# Patient Record
Sex: FEMALE | Race: BLACK OR AFRICAN AMERICAN | HISPANIC OR LATINO | Employment: UNEMPLOYED | URBAN - METROPOLITAN AREA
[De-identification: names, ages, dates, MRNs, and addresses within clinical notes are randomized per-mention and may not be internally consistent; named-entity substitution may affect disease eponyms.]

---
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
## 2019-01-22 ENCOUNTER — HOSPITAL ENCOUNTER (EMERGENCY)
Facility: HOSPITAL | Age: 19
Discharge: HOME | End: 2019-01-22
Attending: EMERGENCY MEDICINE | Admitting: EMERGENCY MEDICINE
Payer: COMMERCIAL

## 2019-01-22 VITALS
TEMPERATURE: 98.4 F | SYSTOLIC BLOOD PRESSURE: 104 MMHG | DIASTOLIC BLOOD PRESSURE: 61 MMHG | RESPIRATION RATE: 18 BRPM | HEART RATE: 70 BPM | WEIGHT: 118.4 LBS | OXYGEN SATURATION: 100 %

## 2019-01-22 DIAGNOSIS — O23.40 UTI IN PREGNANCY: Primary | ICD-10-CM

## 2019-01-22 DIAGNOSIS — O21.9 NAUSEA AND VOMITING IN PREGNANCY: ICD-10-CM

## 2019-01-22 LAB
ANION GAP SERPL CALCULATED.3IONS-SCNC: 12 MMOL/L (ref 4–13)
BACTERIA UR QL AUTO: ABNORMAL /HPF
BASOPHILS # BLD AUTO: 0.04 THOUSANDS/ΜL (ref 0–0.1)
BASOPHILS NFR BLD AUTO: 1 % (ref 0–1)
BILIRUB UR QL STRIP: NEGATIVE
BUN SERPL-MCNC: 6 MG/DL (ref 5–25)
CALCIUM SERPL-MCNC: 9.9 MG/DL (ref 8.3–10.1)
CHLORIDE SERPL-SCNC: 101 MMOL/L (ref 100–108)
CLARITY UR: ABNORMAL
CO2 SERPL-SCNC: 23 MMOL/L (ref 21–32)
COLOR UR: ABNORMAL
CREAT SERPL-MCNC: 0.52 MG/DL (ref 0.6–1.3)
EOSINOPHIL # BLD AUTO: 0.1 THOUSAND/ΜL (ref 0–0.61)
EOSINOPHIL NFR BLD AUTO: 2 % (ref 0–6)
ERYTHROCYTE [DISTWIDTH] IN BLOOD BY AUTOMATED COUNT: 12.7 % (ref 11.6–15.1)
GFR SERPL CREATININE-BSD FRML MDRD: 160 ML/MIN/1.73SQ M
GLUCOSE SERPL-MCNC: 87 MG/DL (ref 65–140)
GLUCOSE UR STRIP-MCNC: NEGATIVE MG/DL
HCT VFR BLD AUTO: 41.6 % (ref 34.8–46.1)
HGB BLD-MCNC: 13.6 G/DL (ref 11.5–15.4)
HGB UR QL STRIP.AUTO: NEGATIVE
IMM GRANULOCYTES # BLD AUTO: 0.02 THOUSAND/UL (ref 0–0.2)
IMM GRANULOCYTES NFR BLD AUTO: 0 % (ref 0–2)
KETONES UR STRIP-MCNC: ABNORMAL MG/DL
LEUKOCYTE ESTERASE UR QL STRIP: ABNORMAL
LYMPHOCYTES # BLD AUTO: 1.51 THOUSANDS/ΜL (ref 0.6–4.47)
LYMPHOCYTES NFR BLD AUTO: 24 % (ref 14–44)
MCH RBC QN AUTO: 30.4 PG (ref 26.8–34.3)
MCHC RBC AUTO-ENTMCNC: 32.7 G/DL (ref 31.4–37.4)
MCV RBC AUTO: 93 FL (ref 82–98)
MONOCYTES # BLD AUTO: 0.87 THOUSAND/ΜL (ref 0.17–1.22)
MONOCYTES NFR BLD AUTO: 14 % (ref 4–12)
NEUTROPHILS # BLD AUTO: 3.74 THOUSANDS/ΜL (ref 1.85–7.62)
NEUTS SEG NFR BLD AUTO: 59 % (ref 43–75)
NITRITE UR QL STRIP: NEGATIVE
NON-SQ EPI CELLS URNS QL MICRO: ABNORMAL /HPF
NRBC BLD AUTO-RTO: 0 /100 WBCS
PH UR STRIP.AUTO: 8.5 [PH] (ref 4.5–8)
PLATELET # BLD AUTO: 248 THOUSANDS/UL (ref 149–390)
PMV BLD AUTO: 10.6 FL (ref 8.9–12.7)
POTASSIUM SERPL-SCNC: 4.8 MMOL/L (ref 3.5–5.3)
PROT UR STRIP-MCNC: ABNORMAL MG/DL
RBC # BLD AUTO: 4.47 MILLION/UL (ref 3.81–5.12)
RBC #/AREA URNS AUTO: ABNORMAL /HPF
SODIUM SERPL-SCNC: 136 MMOL/L (ref 136–145)
SP GR UR STRIP.AUTO: 1.02 (ref 1–1.03)
UROBILINOGEN UR QL STRIP.AUTO: 0.2 E.U./DL
WBC # BLD AUTO: 6.28 THOUSAND/UL (ref 4.31–10.16)
WBC #/AREA URNS AUTO: ABNORMAL /HPF

## 2019-01-22 PROCEDURE — 96365 THER/PROPH/DIAG IV INF INIT: CPT

## 2019-01-22 PROCEDURE — 85025 COMPLETE CBC W/AUTO DIFF WBC: CPT | Performed by: EMERGENCY MEDICINE

## 2019-01-22 PROCEDURE — 96366 THER/PROPH/DIAG IV INF ADDON: CPT

## 2019-01-22 PROCEDURE — 36415 COLL VENOUS BLD VENIPUNCTURE: CPT | Performed by: EMERGENCY MEDICINE

## 2019-01-22 PROCEDURE — 87086 URINE CULTURE/COLONY COUNT: CPT

## 2019-01-22 PROCEDURE — 81001 URINALYSIS AUTO W/SCOPE: CPT

## 2019-01-22 PROCEDURE — 80048 BASIC METABOLIC PNL TOTAL CA: CPT | Performed by: EMERGENCY MEDICINE

## 2019-01-22 PROCEDURE — 96375 TX/PRO/DX INJ NEW DRUG ADDON: CPT

## 2019-01-22 PROCEDURE — 99284 EMERGENCY DEPT VISIT MOD MDM: CPT

## 2019-01-22 RX ORDER — ONDANSETRON 4 MG/1
4 TABLET, ORALLY DISINTEGRATING ORAL EVERY 8 HOURS PRN
Qty: 15 TABLET | Refills: 0 | Status: SHIPPED | OUTPATIENT
Start: 2019-01-22 | End: 2019-05-02 | Stop reason: ALTCHOICE

## 2019-01-22 RX ORDER — CEPHALEXIN 500 MG/1
500 CAPSULE ORAL EVERY 12 HOURS SCHEDULED
Qty: 10 CAPSULE | Refills: 0 | Status: SHIPPED | OUTPATIENT
Start: 2019-01-22 | End: 2019-01-27

## 2019-01-22 RX ORDER — ONDANSETRON 2 MG/ML
4 INJECTION INTRAMUSCULAR; INTRAVENOUS ONCE
Status: COMPLETED | OUTPATIENT
Start: 2019-01-22 | End: 2019-01-22

## 2019-01-22 RX ADMIN — ONDANSETRON 4 MG: 2 INJECTION INTRAMUSCULAR; INTRAVENOUS at 17:46

## 2019-01-22 RX ADMIN — DEXTROSE AND SODIUM CHLORIDE 1000 ML: 5; .9 INJECTION, SOLUTION INTRAVENOUS at 18:03

## 2019-01-22 NOTE — ED PROVIDER NOTES
History  Chief Complaint   Patient presents with    Vomiting During Pregnancy      used - Reports feeling nauseus and having abdominal cramping "all day long" Approximately 9 weeks pregnant  Has not followed up with OB  30-year-old  presents for evaluation of nausea and vomiting with pregnancy  The patient states that her symptoms been ongoing for the past week  They are worse when she attempts to eat  The patient is now having trouble keeping fluids down  She has not had any outpatient prenatal care to this point  She was seen in the emergency department had a pelvic ultrasound demonstrating an IUP  She has been attempting to take prenatal vitamins  None       Past Medical History:   Diagnosis Date    Medical history non-contributory        Past Surgical History:   Procedure Laterality Date    NO PAST SURGERIES         History reviewed  No pertinent family history  I have reviewed and agree with the history as documented  Social History   Substance Use Topics    Smoking status: Never Smoker    Smokeless tobacco: Never Used    Alcohol use No        Review of Systems   Constitutional: Negative for chills and fever  Gastrointestinal: Positive for abdominal pain, nausea and vomiting  Genitourinary: Negative for difficulty urinating, vaginal bleeding and vaginal discharge  All other systems reviewed and are negative  Physical Exam  Physical Exam   Constitutional: She is oriented to person, place, and time  She appears well-developed and well-nourished  No distress  HENT:   Head: Normocephalic and atraumatic  Right Ear: External ear normal    Left Ear: External ear normal    Eyes: Pupils are equal, round, and reactive to light  Conjunctivae and EOM are normal  No scleral icterus  Neck: Normal range of motion  Cardiovascular: Normal rate, regular rhythm and normal heart sounds      Pulmonary/Chest: Effort normal and breath sounds normal  No respiratory distress  Abdominal: Soft  Bowel sounds are normal  There is no tenderness  There is no rebound and no guarding  Musculoskeletal: Normal range of motion  She exhibits no edema  Neurological: She is alert and oriented to person, place, and time  Skin: Skin is warm and dry  No rash noted  Psychiatric: She has a normal mood and affect  Nursing note and vitals reviewed  Vital Signs  ED Triage Vitals   Temperature Pulse Respirations Blood Pressure SpO2   01/22/19 1544 01/22/19 1544 01/22/19 1544 01/22/19 1544 01/22/19 1544   98 4 °F (36 9 °C) 66 20 112/53 98 %      Temp Source Heart Rate Source Patient Position - Orthostatic VS BP Location FiO2 (%)   01/22/19 1544 01/22/19 1544 01/22/19 1544 01/22/19 1544 --   Temporal Monitor Sitting Right arm       Pain Score       01/22/19 1830       No Pain           Vitals:    01/22/19 1544 01/22/19 1830   BP: 112/53 104/61   Pulse: 66 70   Patient Position - Orthostatic VS: Sitting Lying       Visual Acuity      ED Medications  Medications   ondansetron (ZOFRAN) injection 4 mg (4 mg Intravenous Given 1/22/19 1746)   dextrose 5 % and sodium chloride 0 9 % bolus 1,000 mL (0 mL Intravenous Stopped 1/22/19 1943)       Diagnostic Studies  Results Reviewed     Procedure Component Value Units Date/Time    Basic metabolic panel [[id number]]  (Abnormal) Collected:  01/22/19 1747    Lab Status:  Final result Specimen:  Blood from Arm, Right Updated:  01/22/19 1858     Sodium 136 mmol/L      Potassium 4 8 mmol/L      Chloride 101 mmol/L      CO2 23 mmol/L      ANION GAP 12 mmol/L      BUN 6 mg/dL      Creatinine 0 52 (L) mg/dL      Glucose 87 mg/dL      Calcium 9 9 mg/dL      eGFR 160 ml/min/1 73sq m     Narrative:         National Kidney Disease Education Program recommendations are as follows:  GFR calculation is accurate only with a steady state creatinine  Chronic Kidney disease less than 60 ml/min/1 73 sq  meters  Kidney failure less than 15 ml/min/1 73 sq  meters  Urine Microscopic [[id number]]  (Abnormal) Collected:  01/22/19 1751    Lab Status:  Final result Specimen:  Urine from Urine, Clean Catch Updated:  01/22/19 1831     RBC, UA 4-10 (A) /hpf      WBC, UA 10-20 (A) /hpf      Epithelial Cells Occasional /hpf      Bacteria, UA Occasional /hpf     CBC and differential [[id number]]  (Abnormal) Collected:  01/22/19 1747    Lab Status:  Final result Specimen:  Blood from Arm, Right Updated:  01/22/19 1759     WBC 6 28 Thousand/uL      RBC 4 47 Million/uL      Hemoglobin 13 6 g/dL      Hematocrit 41 6 %      MCV 93 fL      MCH 30 4 pg      MCHC 32 7 g/dL      RDW 12 7 %      MPV 10 6 fL      Platelets 806 Thousands/uL      nRBC 0 /100 WBCs      Neutrophils Relative 59 %      Immat GRANS % 0 %      Lymphocytes Relative 24 %      Monocytes Relative 14 (H) %      Eosinophils Relative 2 %      Basophils Relative 1 %      Neutrophils Absolute 3 74 Thousands/µL      Immature Grans Absolute 0 02 Thousand/uL      Lymphocytes Absolute 1 51 Thousands/µL      Monocytes Absolute 0 87 Thousand/µL      Eosinophils Absolute 0 10 Thousand/µL      Basophils Absolute 0 04 Thousands/µL     Urine culture [[id number]] Collected:  01/22/19 1751    Lab Status:   In process Specimen:  Urine from Urine, Clean Catch Updated:  01/22/19 1741    POCT urinalysis dipstick [[id number]]  (Abnormal) Resulted:  01/22/19 1739    Lab Status:  Final result Specimen:  Urine Updated:  01/22/19 1739    ED Urine Macroscopic [[id number]]  (Abnormal) Collected:  01/22/19 1751    Lab Status:  Final result Specimen:  Urine Updated:  01/22/19 1734     Color, UA Celia     Clarity, UA Cloudy     pH, UA 8 5 (H)     Leukocytes, UA Small (A)     Nitrite, UA Negative     Protein, UA Trace (A) mg/dl      Glucose, UA Negative mg/dl      Ketones, UA 40 (2+) (A) mg/dl      Urobilinogen, UA 0 2 E U /dl      Bilirubin, UA Negative     Blood, UA Negative     Specific Gravity, UA 1 020    Narrative:       CLINITEK RESULT No orders to display              Procedures  Procedures       Phone Contacts  ED Phone Contact    ED Course  ED Course as of Jan 23 0018   Tue Jan 22, 2019   1909 Patient improved upon re-evaluation  The plan is to discharge patient with Zofran and antibiotics for urinary tract infection as well as the nausea and vomiting  Outpatient follow-up with OBGYN  The patient (and any family present) verbalized understanding of the discharge instructions and warnings that would necessitate return to the Emergency Department  All questions were answered prior to discharge  MDM  Number of Diagnoses or Management Options  Nausea and vomiting in pregnancy: new and requires workup  UTI in pregnancy: new and requires workup  Diagnosis management comments: Differential diagnosis:  Hyperemesis gravidarum, urinary tract infection, dehydration, other  The plan is to check laboratories and urinalysis  The patient will be given D5 normal saline for rehydration as well as Zofran for the nausea and vomiting  The patient (and any family present) verbalized understanding of the discharge instructions and warnings that would necessitate return to the Emergency Department  All questions were answered prior to discharge         Amount and/or Complexity of Data Reviewed  Clinical lab tests: ordered and reviewed  Review and summarize past medical records: yes      CritCare Time    Disposition  Final diagnoses:   UTI in pregnancy   Nausea and vomiting in pregnancy     Time reflects when diagnosis was documented in both MDM as applicable and the Disposition within this note     Time User Action Codes Description Comment    1/22/2019  7:03 PM Angelica Collins [O23 40] UTI in pregnancy     1/22/2019  7:03 PM Ebenezer Malone [O21 9] Nausea and vomiting in pregnancy       ED Disposition     ED Disposition Condition Comment    Discharge  [facility] discharge to home/self care     Condition at discharge: Good        Follow-up Information     Follow up With Specialties Details Why Contact Info Additional [facility] Schedule an appointment as soon as possible for a visit For further evaluation Radha Costello [phone]  [address], [phone]          Discharge Medication List as of 1/22/2019  7:05 PM      START taking these medications    Details   cephalexin (KEFLEX) 500 mg capsule Take 1 capsule (500 mg total) by mouth every 12 (twelve) hours for 5 days, Starting Tue 1/22/2019, Until Sun 1/27/2019, Print      ondansetron (ZOFRAN-ODT) 4 mg disintegrating tablet Take 1 tablet (4 mg total) by mouth every 8 (eight) hours as needed for nausea or vomiting, Starting Tue 1/22/2019, Print           No discharge procedures on file      ED Provider  Electronically Signed by           Sherita Torres,   01/23/19 6877

## 2019-01-23 LAB — BACTERIA UR CULT: NORMAL

## 2019-01-23 NOTE — ED NOTES
Per Dr Gil Mcclain, patient is ready for discharge, but wants more fluid to run because she kept bending arm  Will reassess       Kieran Jimenez RN  01/22/19 1940

## 2019-01-23 NOTE — DISCHARGE INSTRUCTIONS
Acute Nausea and Vomiting   WHAT YOU NEED TO KNOW:   Acute nausea and vomiting start suddenly, worsen quickly, and last a short time  DISCHARGE INSTRUCTIONS:   Return to the emergency department if:   · You see blood in your vomit or your bowel movements  · You have sudden, severe pain in your chest and upper abdomen after hard vomiting or retching  · You have swelling in your neck and chest      · You are dizzy, cold, and thirsty and your eyes and mouth are dry  · You are urinating very little or not at all  · You have muscle weakness, leg cramps, and trouble breathing  · Your heart is beating much faster than normal      · You continue to vomit for more than 48 hours  Contact your healthcare provider if:   · You have frequent dry heaves (vomiting but nothing comes out)  · Your nausea and vomiting does not get better or go away after you use medicine  · You have questions or concerns about your condition or treatment  Medicines: You may need any of the following:  · Medicines  may be given to calm your stomach and stop your vomiting  You may also need medicines to help you feel more relaxed or to stop nausea and vomiting caused by motion sickness  · Gastrointestinal stimulants  are used to help empty your stomach and bowels  This may help decrease nausea and vomiting  · Take your medicine as directed  Contact your healthcare provider if you think your medicine is not helping or if you have side effects  Tell him or her if you are allergic to any medicine  Keep a list of the medicines, vitamins, and herbs you take  Include the amounts, and when and why you take them  Bring the list or the pill bottles to follow-up visits  Carry your medicine list with you in case of an emergency  Prevent or manage acute nausea and vomiting:   · Do not drink alcohol  Alcohol may upset or irritate your stomach  Too much alcohol can also cause acute nausea and vomiting  · Control stress    Headaches due to stress may cause nausea and vomiting  Find ways to relax and manage your stress  Get more rest and sleep  · Drink more liquids as directed  Vomiting can lead to dehydration  It is important to drink more liquids to help replace lost body fluids  Ask your healthcare provider how much liquid to drink each day and which liquids are best for you  Your provider may recommend that you drink an oral rehydration solution (ORS)  ORS contains water, salts, and sugar that are needed to replace the lost body fluids  Ask what kind of ORS to use, how much to drink, and where to get it  · Eat smaller meals, more often  Eat small amounts of food every 2 to 3 hours, even if you are not hungry  Food in your stomach may decrease your nausea  · Talk to your healthcare provider before you take over-the-counter (OTC) medicines  These medicines can cause serious problems if you use certain other medicines, or you have a medical condition  You may have problems if you use too much or use them for longer than the label says  Follow directions on the label carefully  Follow up with your healthcare provider as directed:  Write down your questions so you remember to ask them during your follow-up visits  © 2017 [address] [employer]  Information is for End User's use only and may not be sold, redistributed or otherwise used for commercial purposes  All illustrations and images included in CareNotes® are the copyrighted property of [employer] , [employer]  or Blair Berman  The above information is an  only  It is not intended as medical advice for individual conditions or treatments  Talk to your doctor, nurse or pharmacist before following any medical regimen to see if it is safe and effective for you  Nausea and Vomiting in Pregnancy   WHAT YOU NEED TO KNOW:   Nausea and vomiting can happen any time of day   These symptoms usually start before the 9th week of pregnancy, and end by the 14th week (second trimester)  Some women can have nausea and vomiting for a longer time  These symptoms can affect some women throughout the entire pregnancy  Nausea and vomiting do not harm your baby  These symptoms can make it hard for you to do your daily activities  DISCHARGE INSTRUCTIONS:   Return to the emergency department if:   · You have signs of dehydration  Examples are dark yellow urine, dry mouth and lips, dry skin, fast heartbeat, and urinating less than usual     · You have severe abdominal pain  · You feel too weak or dizzy to stand up  · You see blood in your vomit or bowel movements  Contact your healthcare provider if:   · You vomit more than 4 times in 1 day  · You have not been able to keep liquids down for more than 1 day  · You lose more than 2 pounds  · You have a fever  · Your nausea and vomiting continue longer than 14 weeks  · You have questions or concerns about your condition or care  Nutrition changes you can make to manage nausea and vomiting:   · Eat small meals throughout the day instead of 3 large meals  You may be more likely to have nausea and vomiting when your stomach is empty  Eat foods that are low in fat and high in protein  Examples are lean meat, beans, turkey, and chicken without the skin  Eat a small snack, such as crackers, dry cereal, or a small sandwich before you go to bed  · Eat some crackers or dry toast before you get out of bed in the morning  Get out of bed slowly  Sudden movements could cause you to get dizzy and nauseated  · Eat bland foods when you feel nauseated  Examples of bland foods include dry toast, dry cereal, plain pasta, white rice, and bread  Other bland foods include saltine crackers, bananas, gelatin, and pretzels  Avoid spicy, greasy, and fried foods  Avoid any other foods that make you feel nauseated  · Drink liquids that contain ginger    Drink ginger ale made with real ginger or ginger tea made with fresh grated shantal  Shantal capsules or shantal candies may also help to decrease nausea and vomiting  · Drink liquids between meals instead of with meals  Wait at least 30 minutes after you eat to drink liquids  Drink small amounts of liquids often throughout the day to prevent dehydration  Ask how much liquid you should drink each day  Other changes you can make to manage nausea and vomiting:   · Avoid smells that bother you  Strong odors may cause nausea and vomiting to start, or make it worse  Take a short walk, turn on a fan, or try to sleep with the window open to get fresh air  When you are cooking, open windows to get rid of smells that may cause nausea  · Do not brush your teeth right after you eat  if it makes you nauseated  · Rest when you need to  Start activity slowly and work up to your usual routine as you start to feel better  · Talk to your healthcare provider about your prenatal vitamins  Prenatal vitamins can cause nausea for some women  Try taking your prenatal vitamin at night or with a snack  If this change does not help, your healthcare provider may recommend a different type of vitamin  · Do not use any medicines, vitamins, or supplements to manage your symptoms without asking your healthcare provider  Many medicines can harm an unborn baby  · Light to moderate exercise  may help to decrease your symptoms  It may also help you to sleep better at night  Ask your healthcare provider about the best exercise plan for you  Follow up with your healthcare provider as directed:  Write down your questions so you remember to ask them during your visits  © 2017 [employer] Information is for End User's use only and may not be sold, redistributed or otherwise used for commercial purposes  All illustrations and images included in CareNotes® are the copyrighted property of A D A M [employer] Inc  or Blair Berman  The above information is an  only   It is not intended as medical advice for individual conditions or treatments  Talk to your doctor, nurse or pharmacist before following any medical regimen to see if it is safe and effective for you  Urinary Tract Infection in Pregnancy   WHAT YOU NEED TO KNOW:   A urinary tract infection (UTI) is caused by bacteria that get inside your urinary tract  The urinary tract includes your kidneys and bladder  UTIs are common in women, especially during pregnancy  This is because of changes in your immune system, hormones, and uterus  As your uterus grows, your bladder may not completely empty  Bacteria can grow in the urine left in your bladder and cause a UTI  UTIs during pregnancy can increase your risk for a kidney infection and  labor  DISCHARGE INSTRUCTIONS:   Return to the emergency department if:   · You are urinating very little or not at all  · You have severe pain  · You have a fever and chills  Contact your healthcare provider if:   · You have pain in the sides of your back  · You do not feel better after 2 days of treatment  · You are vomiting  · You have questions or concerns about your condition or care  Medicines:   · Antibiotics  help fight a bacterial infection  · Medicines  may be given to decrease pain and burning when you urinate  They will also help decrease the feeling that you need to urinate often  These medicines will make your urine orange or red  · Take your medicine as directed  Contact your healthcare provider if you think your medicine is not helping or if you have side effects  Tell him or her if you are allergic to any medicine  Keep a list of the medicines, vitamins, and herbs you take  Include the amounts, and when and why you take them  Bring the list or the pill bottles to follow-up visits  Carry your medicine list with you in case of an emergency  Prevent another UTI:   · Urinate when you feel the urge  Do not hold your urine   Urinate as soon as needed  Always urinate after you have sex  This helps flush out bacteria passed during sex  · Drink liquids as directed  Ask how much liquid to drink each day and which liquids are best for you  You may need to drink more fluids than usual to help flush bacteria out of your urinary tract  Do not drink caffeine or carbonated liquids  These drinks can irritate your bladder  Your healthcare provider may recommend cranberry juice to help prevent a UTI  · Wipe from front to back after you urinate or have a bowel movement  This will help prevent germs from getting into your urinary tract through your urethra  · Do pelvic muscle exercises often  Pelvic muscle exercises may help you start and stop urinating  Strong pelvic muscles may help you empty your bladder easier  Squeeze these muscles tightly for 5 seconds like you are trying to hold back urine  Then relax for 5 seconds  Gradually work up to squeezing for 10 seconds  Do 3 sets of 15 repetitions a day, or as directed  Follow up with your healthcare provider as directed: You may need to return for more urine tests  Write down your questions so you remember to ask them during your visits  © 2017 [address] [employer] Information is for End User's use only and may not be sold, redistributed or otherwise used for commercial purposes  All illustrations and images included in CareNotes® are the copyrighted property of A D A M , Inc  or Blair Berman  The above information is an  only  It is not intended as medical advice for individual conditions or treatments  Talk to your doctor, nurse or pharmacist before following any medical regimen to see if it is safe and effective for you

## 2019-02-14 ENCOUNTER — ULTRASOUND (OUTPATIENT)
Dept: OBGYN CLINIC | Facility: CLINIC | Age: 19
End: 2019-02-14

## 2019-02-14 VITALS
HEART RATE: 100 BPM | HEIGHT: 63 IN | WEIGHT: 117 LBS | BODY MASS INDEX: 20.73 KG/M2 | DIASTOLIC BLOOD PRESSURE: 65 MMHG | SYSTOLIC BLOOD PRESSURE: 98 MMHG

## 2019-02-14 DIAGNOSIS — N91.2 AMENORRHEA: ICD-10-CM

## 2019-02-14 DIAGNOSIS — Z3A.13 13 WEEKS GESTATION OF PREGNANCY: Primary | ICD-10-CM

## 2019-02-14 PROCEDURE — 99213 OFFICE O/P EST LOW 20 MIN: CPT | Performed by: STUDENT IN AN ORGANIZED HEALTH CARE EDUCATION/TRAINING PROGRAM

## 2019-02-14 NOTE — PROGRESS NOTES
Assessment:  22 y o   at approximately 13w3d who presents for a viability scan  Plan:  1  IUP at approximately 13w3d   -ambulatory referral to [facility]   -prenatal panels ordered  2  Will follow up in one week for OB intake      __________________________________________________________________    Subjective   Electa Promise is a 22 y o   who presents for viability scan  Patient denies any contractions, vaginal bleeding, leaking of fluid  This is patient's first pregnancy  This is unplanned but desired pregnancy  Her LMP was mid October, but she does not have regular menses  She has good support at home  She denies any history of STD  She denies a family history of inheritable conditions such as physical or intellectual disabilities, birth defects, blood disorders, heart or neural tube defects  She denies use of nicotine or recreational drug use  The following portions of the patient's history were reviewed and updated as appropriate: allergies, current medications, past family history, past medical history, past social history, past surgical history and problem list     Review of Systems  Pertinent items are noted in HPI           Objective  Vitals:    19 1443   BP: 98/65   Pulse: 100       Physical Exam:  General:   alert and oriented, in no acute distress   Vulva: normal   Vagina: normal mucosa, normal discharge   Cervix: no cervical motion tenderness   Uterus: size consistent with 17 weeks   Adnexa: no mass, fullness, tenderness           Imaging  TAUS:  Single viable IUP at 13w3d - 13w4d  FHT 159bpm  Anterior placenta  No other gross abnormalities

## 2019-02-19 ENCOUNTER — ULTRASOUND (OUTPATIENT)
Dept: PERINATAL CARE | Facility: CLINIC | Age: 19
End: 2019-02-19
Payer: COMMERCIAL

## 2019-02-19 VITALS
HEIGHT: 63 IN | SYSTOLIC BLOOD PRESSURE: 96 MMHG | DIASTOLIC BLOOD PRESSURE: 69 MMHG | HEART RATE: 84 BPM | WEIGHT: 118.6 LBS | BODY MASS INDEX: 21.02 KG/M2

## 2019-02-19 DIAGNOSIS — Z3A.14 14 WEEKS GESTATION OF PREGNANCY: ICD-10-CM

## 2019-02-19 DIAGNOSIS — Z36.82 NUCHAL TRANSLUCENCY OF FETUS ON PRENATAL ULTRASOUND: Primary | ICD-10-CM

## 2019-02-19 PROBLEM — Z34.90 PREGNANCY: Status: ACTIVE | Noted: 2019-02-19

## 2019-02-19 PROCEDURE — 76813 OB US NUCHAL MEAS 1 GEST: CPT | Performed by: OBSTETRICS & GYNECOLOGY

## 2019-02-19 PROCEDURE — 76801 OB US < 14 WKS SINGLE FETUS: CPT | Performed by: OBSTETRICS & GYNECOLOGY

## 2019-02-19 NOTE — PROGRESS NOTES
Used language line [id number]  GILL consistent with 13 week US  No insurance yet  Recommend offering the quad screen when insurance coverage in place  Scheduled 20 week US Jairo Lebron MD

## 2019-02-20 PROBLEM — Z36.9 ENCOUNTER FOR FETAL ULTRASOUND: Status: ACTIVE | Noted: 2019-02-20

## 2019-02-26 ENCOUNTER — INITIAL PRENATAL (OUTPATIENT)
Dept: OBGYN CLINIC | Facility: CLINIC | Age: 19
End: 2019-02-26

## 2019-02-26 VITALS
HEIGHT: 63 IN | WEIGHT: 117 LBS | HEART RATE: 80 BPM | DIASTOLIC BLOOD PRESSURE: 64 MMHG | BODY MASS INDEX: 20.73 KG/M2 | SYSTOLIC BLOOD PRESSURE: 98 MMHG

## 2019-02-26 DIAGNOSIS — Z3A.14 14 WEEKS GESTATION OF PREGNANCY: ICD-10-CM

## 2019-02-26 DIAGNOSIS — Z34.92 PRENATAL CARE IN SECOND TRIMESTER: Primary | ICD-10-CM

## 2019-02-26 DIAGNOSIS — Z36.9 ENCOUNTER FOR FETAL ULTRASOUND: ICD-10-CM

## 2019-02-26 PROCEDURE — 99211 OFF/OP EST MAY X REQ PHY/QHP: CPT

## 2019-02-26 NOTE — PATIENT INSTRUCTIONS
Señales remigio el embarazo: Cuando llamar    1  sangrado vaginal  2  Dolor abdominal jeny que no desaparece  3  Fiebre (más de 100 4 y no se hannah con Tylenol)  4  Vómitos persistentes que freitas más de 24 horas  5  dolor de pecho  6  Dolor o ardor al orinar  7  Dolor de herman severo que no se resuelve con Tylenol  8  Visión borrosa o janis puntos en pineda visión  9  Hinchazón repentina de pineda briseida o brad  10  Enrojecimiento, hinchazón o dolor en trinh pierna  11  Un aumento de peso repentino en pocos días  12  Contar los movimientos fetales del bebé  (después de 28 semanas o el sexto mes de embarazo)  15  Trinh pérdida de líquido acuoso de la vagina: puede ser un chorro, un goteo o trinh humedad continua  14  Después de 20 semanas de embarazo, calambres rítmicos (más de 4 por hora) o menstruales bari dolor Clevester Devorah / Doron Perks y Embarazo:  La siguiente lista de medicamentos de Hebert Landry generalmente se considera ocampo de ryan remigio el [facility]  Tenga cuidado de no duplicar los productos que contienen acetaminofén (Tylenol)  Resfriados / dolor de garganta   Robitussin DM - Simple (guaifenesina)   Aerosol nasal salino   Gárgaras de agua salada caliente   Cepacol pastillas para la garganta o enjuague bucal (cetilpiridinio)   Sucrets (hexylresoricinol)    Harolyn Ophir LA D - o DESCONGESANTE   Claritin (loratadine)   Zrytec (cetirizina)   Allerga (fexofenadina)      Zayda de Zoe Salomón / [place] y molestias:   Tylenol (paracetamol) (acetaminophen)  NO exceda más de 3000 mg de Tylenol en un período de 24 horas        Acidez   Mylanta (hidróxido de aluminio / simeticona, hidróxido de magnesio)   Maalaox (hidróxido de aluminio y [place], hidróxido de magnesio)   Tums (carbonato de calcio)   Riopan (magaldrate)      Estreñimiento   Colace (docusato de sodio)   Surfak (docusato de sodio)   MiraLAX   Supositorios de glicerina   Flota enema (fosfato de sodio y bifosfato de sodio)  Náuseas vómitos   Vitamina B6 (piridoxina): puede ryan 50 mg a la hora de acostarse, 25 mg por la mañana, 25 mg por la tarde   Unisom (doxilamina): se puede usar para las náuseas / vómitos (tk trinh tableta de 25 mg por la mitad)  Puede causar somnolencia  Dormir   Benadryl (difenhidramina): tome 1-2 tabletas según sea necesario antes de acostarse   Tableta Unisom (doxilamina) de 25 mg    Tableta de melatonina de 5 mg: según sea necesario antes de acostarse      En general, la forma genérica de la medicina suele ser más económica que la forma de yamini del [place]  Leeroy Goody Un embarazo,   CUIDADO AMBULATORIO:   Lo qué necesita saber sobre el Magen : Un embarazo normal dura alrededor de 40 semanas  El primer trimestre dura desde pineda último periodo hasta la semana 12 de Magen   El karolina trimestre se extiende desde la semana 13 de pineda embarazo hasta la semana 23  El tercer trimestre se extiende desde la semana 24 de embarazo hasta que nazca pineda bebé  Si usted conoce la fecha de pineda último periodo, pineda médico puede calcular la fecha de nacimiento de pineda bebé  Es posible que usted de a mahsa a pineda bebé en cualquier momento desde la semana 37 hasta 2 semanas después de la fecha calculada de Artemio  Busque atención médica de inmediato si:   · Usted presenta un tony dolor de herman que no desaparece  · Usted tiene cambios en la visión nuevos o en aumento, bari visión borrosa o con manchas  · Usted tiene inflamación nueva o creciente en pineda briseida o brad  · Usted tiene dolor o cólicos en el abdomen o la parte baja de la espalda  · Usted tiene sangrado vaginal   Comuníquese con pineda médico o obstreta si:   · Usted tiene calambres, presión o tensión abdominal     · Usted tiene un cambio en la secreción vaginal     · Usted no puede retener alimentos ni líquidos y está perdiendo [place]  · Usted tiene escalofríos o fiebre      · Usted tiene comezón, ardor o dolor vaginal      · Usted tiene [place] secreción vaginal amarillenta, verdosa, edison o de [employer]  · Usted tiene dolor o ardor al [facility], orina menos de lo habitual o tiene [place] rosada o sanguinolenta  · Usted tiene preguntas o inquietudes acerca de pineda condición o cuidado  Cambios corporales que pueden ocurrir remigio pineda embarazo:   · Los cambios en los senos  que usted Kiesha Robel sensibilidad y cosquilleo remigio la primera parte de pineda [facility]  Los senos se volverán más grandes  Es posible que necesite un sostén con soporte  Es posible que usted cameron [facility] secreción delgada y BILLY, conocida bari calostro, que sale de rolando pezones remigio el karolina trimestre  El calostro es un líquido que se convertirá en [place] alrededor de 3 días después de usted twyla dado a mahsa  · Cambios en la piel y estrías  podrían ocurrir remigio pineda embarazo  Es posible que usted tenga marcas araujo, conocidas bari estrías, en pineda piel  Las [employer] se desvanecen después del [facility]  Utilice crema si pineda piel está seca y con comezón  La piel de pineda briseida, alrededor de los pezones y debajo de pineda ombligo podría oscurecerse  La mayoría del [place], pineda piel Gearld Hitch a pineda color normal después del nacimiento de pineda bebé  · El malestar matutino  consiste en náuseas y vómitos que pueden ocurrir en cualquier momento del día  Evite los alimentos grasosos y picantes  Coma comidas pequeñas remigio el día en vez de porciones grandes  El jengibre puede ayudar a [employer]  Consulte con pineda médico acerca de otras formas para disminuir las náuseas y el vómito  · Acidez estomacal  puede ser causada por los cambios hormonales remigio pineda [place]  El [employer] en crecimiento puede empujar pineda estómago hacia arriba y forzar ácido estomacal a acumularse dentro de pineda esófago  [place] 4 o 5 comidas pequeñas cada día en vez de comidas grandes  Evite los alimentos picantes  Evite comer dionisio antes de irse a la cama      · Estreñimiento  puede desarrollarse remigio pineda embarazo  Para tratar el estreñimiento, coma alimentos altos en fibra bari cereales con fibra, frijoles, frutas, verduras, panes integrales y [place]  Dillon Esequiel de Jacklyn regular y tome suficiente agua  Es posible que pineda médico sugiera un suplemento con fibra para ablandar rolando evacuaciones intestinales  Consulte con pineda médico antes de usar cualquier medicamento para disminuir el estreñimiento  · Las hemorroides  son Radha Mullet grandes en el área rectal  Pueden causar dolor, comezón y sangrado de color blackmon vivo en pineda recto  Para disminuir el riesgo de hemorroides, prevenga el estreñimiento y no se esfuerce cuando tenga trinh evacuación intestinal  Si usted tiene hemorroides, sumérjase en trinh bañera con agua tibia para aliviar la incomodidad  Consulte con pineda médico cómo puede tratar las hemorroides  · Los calambres y la hinchazón en las piernas  pueden ser causados por niveles bajos de calcio o por el peso adicional del [place]  Eleve rolando piernas por encima del nivel de pineda corazón para disminuir la hinchazón  Remigio un calambre en la pierna, estire o de un masaje al [employer] que tiene el calambre  El calor puede ayudar a disminuir el dolor y los espasmos musculares  Aplique calor sobre el músculo por 20 a 30 minutos cada 2 horas por la cantidad de días que se le indique  · Dolor en la espalda  puede ocurrir a medida que pineda bebé crece  No esté de pie por largos periodos de tiempo ni levante objetos pesados  Use trinh buena postura mientras esté de pie, se agache o se doble  Use zapatos de tacón bajo con un buen soporte  Descansar puede también ayudarla a aliviar el dolor de espalda  Pregunte a pineda médico acerca de ejercicios que usted pueda hacer para fortalecer los músculos de pineda espalda  Manténgase saludable remigio pineda embarazo:   · Consuma alimentos saludables y variados    Alimentos saludables incluyen frutas, verduras, panes de carolyn integral, alimentos lácteos bajos en grasa, frijoles, William Lady y pescado  Hodgen líquidos bari se le haya indicado  Pregunte cuánto líquido debe ryan cada día y cuáles líquidos son los más adecuados para usted  Limite el consumo de cafeína a menos de [address]  Limite el consumo de pescado a 2 porciones cada semana  Escoja pescado con concentraciones bajas de dipesh bari atún ligero enlatado, camarón, cangrejo, salmón, bacalao o tilapia  No  coma pescado con concentraciones altas de dipesh bari pez bernice, caballa gigante, pargo rayado y tiburón  · [address] [place] [place]  Pineda necesidad de ciertas vitaminas y [address], bari el ácido fólico, aumenta remigio el [facility]  Las vitaminas prenatales proporcionan algunas de las vitaminas y minerales adicionales que usted necesita  Las vitaminas prenatales también podrían ayudar a disminuir el riesgo de ciertos defectos de nacimiento  · Pregunte cuánto peso usted debe aumentar remigio pineda embarazo  Demasiado aumento de peso o muy poco puede ser poco saludable para usted y pineda bebé  · Consulte con pineda médico acerca de hacer ejercicio  El ejercicio moderado puede ayudarla a mantenerse en forma  Pineda médico la ayudará a planear un programa de ejercicios que sea seguro para usted remigio pineda [facility]  · No fume  Si usted fuma, nunca es demasiado tarde para dejar de hacerlo  Fumar aumenta el riesgo de aborto espontáneo y otros problemas de fernando remigio pineda [place]  Fumar puede causar que pineda bebé nazca antes de tiempo o que pese menos al nacer  Solicite información a pineda médico si usted necesita ayuda para dejar de fumar  · No consuma alcohol  El alcohol pasa de pineda cuerpo al bebé a través de la placenta  Puede afectar el desarrollo del cerebro de pineda bebé y provocar el síndrome de alcoholismo fetal (SAF)  FAS es un refugio de condiciones que causan [address], de comportamiento y de crecimiento  · Consulte con pineda médico antes de ryan cualquier medicamento  Muchos medicamentos pueden perjudicar a tong bebé si usted los fatmata [address]  No tome ningún medicamento, vitaminas, hierbas o suplementos sin matheus consultar con tong Robbin Diesel  use drogas ilegales o de la marcum (bari marihuana o cocaína) mientras está embarazada  Consejos de seguridad:   · Evite jacuzzis y saunas  No use un jacuzzi o un sauna mientras usted está embarazada, especialmente remigio el primer trimestre  Los [facility] y los saunas aumentan la temperatura de tong bebé y el riesgo de defectos de nacimiento  · Evite la toxoplasmosis  [place] es trinh infección causada por comer carne cruda o estar cerca del excremento de un pro infectado  [place] puede causar malformaciones congénitas, aborto espontáneo y Vern Schein  Lávese las brad después de tocar carne cruda  Asegúrese de que la carne esté desiree cocida antes de comerla  Evite los huevos crudos y la Delayne Pupa  Use guantes o pida que alguien la ayude a limpiar la caja de arena del pro mientras usted Ammon Milena  · Consulte con tong médico acerca de viajar  El [address] cómodo para viajar es remigio el karolina trimestre  Pregunte a tong médico si usted puede viajar después de las 36 semanas  Es posible que no pueda viajar en avión después de las 36 [address]  También le puede recomendar que evite largos viajes por carretera  Programe un control con tong médico u obstetra según lo indicado:  Vaya a todas rolando citas prenatales remigio tong embarazo  Anote rolando preguntas para que se acuerde de hacerlas remigio rolando visitas  © 2017 [address] Information is for End User's use only and may not be sold, redistributed or otherwise used for commercial purposes  All illustrations and images included in CareNotes® are the copyrighted property of A D A M , Inc  or Blair Berman  Esta información es sólo para uso en educación  Tong intención no es darle un consejo médico sobre enfermedades o tratamientos  Colsulte con pineda Sylvia Emerson farmacéutico antes de seguir cualquier régimen médico para saber si es seguro y efectivo para usted  [place] y vómito en el embarazo   CUIDADO AMBULATORIO:   La náusea y el vómito en el embarazo  pueden suceder a cualquier hora del día  Estos síntomas usualmente comienzan antes de la semana 9 del embarazo y terminan para la semana 14 (karolina trimestre)  Algunas mujeres pueden tener náusea o vómito por un tiempo prolongado  Estos síntomas pueden afectar a algunas mujeres remigio el embarazo  La náusea y el vómito no dañan a pineda bebé  Estos síntomas pueden dificultarle shona actividades diarias  Busque atención médica de inmediato si:   · Usted presenta signos de deshidratación  Por ejemplo, orina de color amarillo oscuro, boca y labios resecos, piel reseca, latido cardíaco acelerado y orinar menos de lo normal     · Usted tiene dolor abdominal intenso  · Usted se siente demasiado débil o mareado bari para ponerse de pie  · Usted nota selena en pineda vómito o en shona deposiciones  Pregúntele a pineda [place] vitaminas y minerales son adecuados para usted  · Usted vomita más de 4 veces en 1 día  · Usted no ha podido retener líquidos en el estómago por más de 1 día  · Usted pierde más de 2 libras  · Usted tiene fiebre  · Shona náuseas y vómito continúan por más de 14 semanas  · Usted tiene preguntas o inquietudes acerca de pineda condición o cuidado  El tratamiento  para la náusea y el vómito en el embarazo generalmente no es necesario  Usted puede [employer] alimentos que come y en shona actividades para ayudar a controlar shona síntomas  Es posible que usted necesite probar varias cosas para determinar qué funciona mejor para usted  Hable con pineda médico si shona síntomas no mejoran con los cambios que se recomiendan a continuación  Es posible que usted necesite vitamina B6 y medicamento si estos cambios no ayudan o si shona síntomas se vuelven graves     Cambios de nutrición que usted puede realizar para controlar la náusea y el vómito:   · Coma porciones pequeñas remigio el día en vez de 3 comidas con porciones grandes  Es más probable que usted tenga náusea y vómito cuando pineda estómago está vacío  Consuma alimentos bajos en grasa y ricos en proteínas  Ejemplos son Fertile Rang, frijoles, pavo y brittaney sin jorge Lopez, bari galletas saladas, cereal seco o un sandwich chico antes de [place]  · Coma galletas saladas o pan julia antes de levantarse de pineda cama por la mañana  Levántese de la cama lentamente  Los movimientos repentinos podrían provocarle mareos y [place]  · Consuma alimentos blandos cuando se sienta con náuseas  Ejemplos de alimentos blandos son el pan julia, cereal seco, pasta sin Alyssa Budds y grossman  Otros alimentos blandos incluyen a las [employer], plátanos, gelatina y pretzels  Evite los alimentos condimentados, grasosos y fritos  Evite otros alimentos que le provoquen náuseas  · Rotonda líquidos que contengan gengibre  Rotonda refresco de gengibre hecho con gengibre real o té de gengibre hecho con gengibre fresco rallado  Las [employer] o dulces de gengibre también podrían ayudar a aliviar la náusea y el vómito  · Rotonda líquidos entre alimentos en vez de tomarlos con los alimentos  Espere al menos 30 minutos después de comer para ryan líquidos  Rotonda cantidades pequeñas de líquidos con frecuencia remigio el día para evitar la deshidratación  Consulte cuál es la cantidad de líquido que usted debería consumir al día  Otros cambios que usted puede realizar para controlar la náusea y el vómito:   · Evite los olores que la [place]  Los olores hoang podrían provocar que [employer] náuseas y el vómito, o podrían empeorarlo   Camine un poco, prenda un ventilador o trate de dormir con la ventana abierta para respirar aire fresco  Cuando esté cocinando, raul las ventanas para eliminar el olor que podría provocarle náuseas  · No se cepille rolando dientes inmediatamente después de comer  si eso le provoca náuseas  · Descanse cuando lo necesite  Comience trinh actividad lentamente y vuelva a tong rutina normal conforme se empiece a sentir mejor  · Hable con tong médico acerca de las vitaminas prenatales  Las vitaminas prenatales pueden provocar náuseas a algunas mujeres  Trate de tomárselas por la noche o con un bocadillo  Si valencia cambio no le [facility], tong médico podría recomendarle un tipo de vitamina diferente  · No use ningún medicamento, vitamina o suplemento para controlar rolando síntomas sin antes consultarlo con tong médico   Varios medicamentos pueden dañar a tong bebé que no ha nacido  · El ejercicio de ligero a moderado  podría ayudar a aliviar rolando síntomas  También podría ayudarla a dormir mejor por la noche  Pregunte a tong médico acerca del mejor plan de ejercicio para usted  Acuda a rolando consultas de control con tong médico según le indicaron  Anote rolando preguntas para que se acuerde de hacerlas remigio rolando visitas  © 2017 [address] [facility] Information is for End User's use only and may not be sold, redistributed or otherwise used for commercial purposes  All illustrations and images included in CareNotes® are the copyrighted property of A D A M , Inc  or Blair Berman  Esta información es sólo para uso en educación  Tong intención no es darle un consejo médico sobre enfermedades o tratamientos  Colsulte con tong Freeda Kraus farmacéutico antes de seguir cualquier régimen médico para saber si es seguro y efectivo para usted  Virus del Zika: Información para mujeres embarazadas   CUIDADO AMBULATORIO:   El virus del [address] [facility] mosquitos son los portadores del virus del anda  El virus es transmitido a un humano a través de la picadura de un mosquito infectado   El virus también se podría transmitir de [facility] persona a otra por medio de la relación sexual  El virus del [place] se podría transmitir de trinh madre al feto  Stover podría provocar defectos de nacimiento, bari trinh falta de desarrollo cerebral  También puede causar la pérdida del embarazo  En la actualidad no hay trinh vacuna para evitar el virus del [facility]  Los signos y síntomas más comunes incluyen los siguientes:  Es posible que no presente signos ni síntomas del virus del [facility]  Si usted desarrolla signos o síntomas, podrían presentarse de repente y durar por 2 a 7 monika  Puede presentar cualquiera de los siguientes signos o síntomas:  · Fiebre o escalofríos    · Urticaria    · Dolor de herman    · Dolor muscular o articular    · Enrojecimiento o comezón en los ojos  Pregúntele a pineda Lesle Fetch vitaminas y minerales son adecuados para usted  · Usted piensa que ha estado expuesto al virus del [facility]  · Tiene síntomas del virus del [facility]  · Usted tiene preguntas o inquietudes acerca de pineda condición o cuidado  Evite las picaduras de mosquitos:  No debe viajar a un sitio donde le virus del Zika es común  Pregunte a pineda médico qué lugar es seguro para viajar  Evite las picaduras de mosquito para disminuir pineda riesgo de contraer la infección por el virus del Zika:  · Aplique repelente de insectos  Pregunte a pineda médico cuál repelente de insectos es adecuado para usted  La mayoría de los repelentes de insectos se pueden usar sin riesgos remigio el embarazo  Siga las indicaciones en el empaque del repelente de insectos  La siguiente es trinh lista de consejos para usar el repelente de insectos:     ¨ No aplique repelente de insectos en la piel debajo de la ropa  ¨ Aplique protector solar antes de aplicar repelente de insectos  ¨ Use un repelente de insectos cada vez que tenga planeado estar al [place] [employer]  Use un repelente todo el tiempo si usted viaja o vive en lugares de alto riesgo  Vuelva a aplicarse el repelente según las indicaciones       ¨ Aplíquese repelente de insectos todos los días por 3 semanas después de viajar a áreas de [address] riesgo  · Use trinh camisa de manga larga y pantalones  [place] le protegerá la piel de las picaduras de mosquito  · Use mallas o mosquiteros  Use un mosquitero alrededor de pineda cama  Cuando viaje, escoja un lugar que tenga mosquiteros en todas las ventanas y ted  [address] [facility] y ted en pineda casa  Repare las redes o mosquiteros que tengan agujeros, o compre mosquiteros o redes nuevos  · [address] ted y ventanas cerradas  En lo posible, use el aire acondicionado para mantener fresca la casa  · Aplique un repelente de insectos en la ropa y el equipo  [place] incluye las botas, pantalones, medias y carpas de campaña  Ted esto cuando acampe, escale o trabaje en el exterior  Usted también puede comprar prendas de vestir e implementos que ya vienen con el repelente de insectos  · Limpie y vacíe todos los recipientes con agua trinh vez a la semana  [address] recipientes para los [address], los baldes de agua, los tinsley de desagüe de la casa, los floreros y la lilly para pájaros  Los mosquitos ponen los huevos cerca del agua  Es mejor que vacíe y restriegue estos recipientes con agua y [place]  Mantenga los recipientes de agua cubiertos o desiree cerrados en cuanto sea posible  · Use un repelente de insectos en el interior y exterior de pineda casa  Use un atomizador repelente de insectos que es seguro para usar en el interior de pineda casa  Coloque un aparato atomizador contra los mosquitos en el exterior de pineda casa  Instale el aparato en un lugar oscuro y fresco  Consulte a pineda médico dónde puede conseguir estos artículos  Siga las instrucciones que vienen con estos productos  Practique sexo seguro remigio el embarazo:  Lo siguiente disminuirá el riesgo de contraer el virus del [place]  También disminuirá el riesgo de que le pase el virus del Zika a pineda bebé    · No tenga contacto sexual con un hombre o trinh mandy con infección por el virus del [address] embarazada  No tenga contacto sexual con un hombre o trinh mandy que azul estado expuestos al virus del Zika mientras usted Yolanda Shine  Tong ana puede estar en riesgo por exposición si ha viajado a trinh shanell donde existe infección de [facility]  Por sexo nos referimos al sexo oral, vaginal y anal      · Si usted decide tener relaciones sexuales remigio el [facility], utilice un condón o un método de bhatt de látex cada vez que tenga relaciones sexuales  Use protección para todo tipo de contacto sexual con un hombre o trinh mandy  [place] incluye el sexo oral, vaginal y anal  Use un condón nuevo o trinh bhatt de látex cada vez que tenga relaciones sexuales  Asegúrese que el condón se ajuste y esté desiree colocado  Si usted es alérgico al látex, use un producto sin base de látex bari poliuretano  Para obtener la información más actualizada sobre el virus del Zika:  Lo que se conoce del virus del Zika está cambiando rápidamente  Obtenga la información más actualizada en:  · Centers for Disease Control and Prevention Information on [address]  [address] [facility]  Phone: [phone]  Web Address: [facility]  Acuda a rolando consultas de control con tong médico según le indicaron  Anote rolando preguntas para que se acuerde de hacerlas remigio rolando visitas  © 2017 [address]is[address] Information is for End User's use only and may not be sold, redistributed or otherwise used for commercial purposes  All illustrations and images included in CareNotes® are the copyrighted property of A D A M , Inc  or Blair Berman  Esta información es sólo para uso en educación  Tong intención no es darle un consejo médico sobre enfermedades o tratamientos  Colsulte con tong Kathy Wendover farmacéutico antes de seguir cualquier régimen médico para saber si es seguro y efectivo para usted  Dieta para el embarazo   LO QUE NECESITA SABER:   ¿Qué es trinh dieta saludable remigio el [facility]? Nancy Lanes saludable remigio el embarazo es un plan alimenticio que proporciona la cantidad de calorías y nutrientes que usted necesita remigio el embarazo  El cuerpo necesita calorías y nutrientes adicionales para apoyar el desarrollo del bebé  Usted necesita aumentar la cantidad correcta de peso para tener un bebé y un embarazo saludables  Los bebés que nacen con un peso saludable tienen un riesgo abraham de ciertos problemas cardíacos al nacer y remigio pineda leigh  Nancy Lanes saludable podría ayudarlo a evitar que aumente mucho de [place]  El aumento excesivo de peso le podría provocar problemas remigio el Gillespie Faraz y Marialuisa  ¿Qué debería evitar remigio el Zeynep Faraz? · Alcohol:  No darrion alcohol remigio el embarazo  El alcohol puede aumentar el riesgo de sufrir un aborto espontáneo (perder el bebé)  El bebé también podría nacer muy pequeño y [place] Gustavo otros problemas de [place], bari problemas de aprendizaje remigio pineda leigh  · Cafeína:  No se conoce cuales son Eder Shima  Limite el consumo de cafeína para evitar posibles problemas de fernando  La cafeína puede encontrase en el café, té, gaseosas, bebidas deportivas y chocolate  · Alimentos que contienen dipesh:  El dipesh se encuentra de [place] natural en jamshid todos los tipos de pescados y mariscos  Algunos tipos de pescados absorben niveles más altos de dipesh que pueden ser nocivos para un bebé antes de nacer  Consuma solamente pescados y mariscos bajos en dipesh  Coma un silverio de 12 onzas a la semana de pescado o [employer] tengan un nivel bajo de dipesh  Entre éstos se [facility], el ún [place] agua, el abran, el caitlyn y Taz Rios In Pedriolo  Coma sólo 6 onzas de atún walter por semana  El atún albacora tiene más dipesh que el atún [place]  No  coma tiburón, pez bernice, caballa ni blanquillo       · Alimentos crudos o poco cocidos:  Usted no debería comer cynthia, luan, husandhyaos, pescado o mariscos (camarón, chance cifuentes) crudos o poco cocidos  Cocine los alimentos sobrantes y listos para comer bari los hot dogs hasta que estén desiree calientes  · Alimentos sin pasteurizar:  Los alimentos sin pasteurizar son aquéllos que no pasaron por el proceso de calentamiento (pasteurización) que destruye la bacteria  Usted no debe beber [place], Delia 88 que no haya sido pasteurizado  Estos [place] [employer], feta, Camembert, blue y [place]  ¿Qué alimentos se pueden comer remigio el embarazo? Consuma trinh variedad de alimentos de cada laci de los grupos que se enumeran abajo  El [place]'s dirá cuántas porciones de cada refugio usted debería comer diariamente para obtener las calorías suficientes  La cantidad de calorías que usted necesita depende de la actividad diaria, el peso antes del [facility] y [place] actual  Los médicos dividen el embarazo en 3 periodos de tiempo que se conocen bari trimestres  En el primer trimestre, usualmente usted no necesita calorías adicionales  En el karolina y tercer trimestre, la mayoría de las mujeres deberían consumir alrededor de 300 calorías adicionales cada día  · Frutas y verduras:  La mitad del plato debería contener frutas y verduras  ¨ Frutas:  Seleccione frutas frescas, enlatadas o secas tan seguido bari sea posible  ¨ 1 taza de [place], picada, cocida o enlatada (enlatada en almíbar o 100% de [facility])    ¨ Un durazno, naranja o plátano francisca    ¨ ½ taza de fruta seca    ¨ 1 taza de jugo de fruta    ¨ Verduras:  Consuma mas verduras de color claudia oscuro, blackmon o anaranjado  Los vegetales claudia oscuro incluyen la brócoli, [facility] y repollo claudia  Ejemplos de verduras de color anaranjado y blackmon son las zanahorias, el camote, la calabaza de invierno, naranjas y chile blackmon      ¨ 1 taza de vegetales cocidos o crudos    ¨ 1 taza de jugo de verduras    ¨ 2 tazas de hojas verdes crudas    · Granos:  La mitad de los granos que consume cada día deberían ser granos enteros  ¨ Granos integrales      ¨ ½ taza de arroz integral o sourav cocidos    ¨ 1 taza (1 onza) de cereal seco de grano entero    ¨ 1 rebanada de pan de 100% de grano entero o de cordero    ¨ 3 tazas de palomitas de maíz    ¨ Otros granos:      ¨ ½ taza de arroz walter o pasta cocidos    ¨ ½ de un pan inglés    ¨ 1 tortilla pequeña de harina o de maíz    ¨ 1 rosquilla pequeña    · Productos lácteos:  Elija productos lácteos sin grasa o bajos en grasa:    ¨ 1½ onzas de queso firme (dada Vance, brian)     ¨ 1 taza (8 onzas) de leche o yogurt sin o bajo en grasa    ¨ 1 taza de yogurt o pudín congelado bajo en grasa    · Carne y otras garcia de proteínas:  Elija vald magras y de ave  Hornee, ase y cocine la carne a la eliana en vez de freírla  Incluya trinh variedad de mariscos en lugar de algunas vlad y aves cada semana  Consuma trinh variedad de alimentos con proteínas:    ¨ ½ onza de wilfredo secos (12 almendras, 24 pistachos, 7 mitades de nueces) o 1 cucharada de crema de cacahuate (1 onza)    ¨ ¼ de taza de soya tofu o tempeh (1 onza)    ¨ 1 huevo    ¨ ¼ de taza de frijoles, chícharos o lentejas cocidos (1 onza)    ¨ 1 pechuga de brittaney pequeña o 1 trucha pequeña (alrededor de 3 onzas)    ¨ 1 trozo de salmón (4 a 6 onzas)    ¨ 1 hamburguesa pequeña de carne magra (2 a 3 onzas)    · Grasas:  Limite las grasas saturadas, trans y el colesterol  Estas grasas no son saludables y se encuentran en la [facility], la [place], la margarina de last y en la grasa animal  Elija grasas saludables bari la poliinsaturada y la monoinsaturada:     ¨ 1 cucharada de aceite de [place], [place], [place], [place] o de soya    ¨ 1 cucharada de margarina carmelo    ¨ 1 cucharadita de [facility]    ¨ 2 cucharadas de aderezo para ensaladas    ¨ ½ de un aguacate  ¿Qué suplementos vitamínicos y minerales podría necesitar? El médico le indicará si usted necesita un suplemento y qué tipo debería ryan   Hable con pineda médico antes de ryan cualquier otra clase de suplemento, incluyendo los herbales (naturales)  · Vitaminas prenatales:  Consuma trinh variedad de alimentos saludables, aun si usted está tomando vitaminas prenatales  Si olvida ryan la vitamina, no se tome el doble al siguiente día  · Ácido fólico:  Usted necesita al menos 572 mcg de ácido fólico por día antes de embarazarse  El ácido fólico ayuda a formar el cerebro y la médula dumont del bebé en el comienzo del [facility]  Remigio el [facility], tong necesidad diaria de ácido fólico aumenta a alrededor de 600 mcg  Obtenga diariamente ácido fólico, consumiendo frutas y jugos cítricos, verduras de hojas verdes, hígado o frijoles secos  El ácido fólico también se agrega a ciertos [employer] cereales para el desayuno, los productos de grossman, las harinas y las pastas  · Ledy:  El ledy es un mineral que el cuerpo necesita para producir hemoglobina, que es trinh parte de los glóbulos rojos  La hemoglobina ayuda a que la selena transporte oxígeno de los pulmones al ana del cuerpo  Los alimentos que son Lelia Musty buena lilly de ledy son la carne, la carne de ave, pescado, frijoles, espinaca y cereales y panes fortificados  Tong cuerpo absorberá el ledy de garcia de carne, si tiene trinh lilly de vitamina C al [employer]  [place] té y café lejos de alimentos fortificados con ledy y suplementos de ledy  Usted necesita alrededor de 30 miligramos de ledy cada día remigio el embarazo  · Calcio y vitamina D:  Las mujeres que no consumen productos lácteos pueden necesitar un suplemento de calcio y vitamina D  Hable con tong médico sobre suplementos de calcio si no come regularmente buenas garcia de calcio  La cantidad de calcio que usted necesita es de 1,300 mg si tiene entre 14 y 25 años de edad y de 1,000 mg si tiene entre 23 y 48 años de edad  ¿Qué cambios en la dieta podrían ayudar si tengo malestar matutino?   El malestar matutino es común [employer] [place] [employer] primeros meses de embarazo  Usted podría sentirse con náuseas y vomitar varias veces al día  Para mejorar los síntomas del malestar matutino, coma poco y varias veces en vez de 3 comidas grandes  Los [employer] en carbohidratos bari las galletas saladas, el pan julia y la pasta podrían ser mas fáciles de comer  [place] líquidos [employer] comidas en vez de hacerlo con las comidas  ¿Qué cambios en la dieta podrían disminuir el estreñimiento? Viola dieta kenna en fibra puede mejorar los síntomas de estreñimiento  Los cereales integrales para desayunar, los panes integrales y los jugos de [place] pasa son altos en Lore  Lillian Bucy y verduras crudas, así bari los frijoles cocidos también son [place] buena lilly de [place]  También podría ayudar el aumentar el consumo de líquidos y realizar actividad física regularmente  Consulte con pineda médico antes de empezar un régimen de ejercicios  ¿Qué cambios en la dieta podrían disminuir la [place]? Para mejorar los síntomas de la [place], no se acueste después de comer  Cuando se acueste, duerma con la herman un poco elevada  Coma poco y con frecuencia, en vez de 3 comidas grandes  También podría ser de "[employer] evitar la cafeína, el chocolate y las comidas condimentadas  ¿Cómo puedo obtener suficiente calcio si no puedo tolerar los productos lácteos? Si usted no puede beber [place] o consumir productos lácteos, trate la [place] sin lactosa o baja en lactosa, o la leche de soya fortificada con calcio  Pregunte a pineda médico acerca de píldoras que pueda ryan para ayudar a digerir los productos lácteos  Consuma otras bebidas y comidas que estén fortificadas con calcio, bari el [place]  ¿Qué otras pautas saludables leela seguir? · Kaltag Bachelor y veganos:  Si usted es vegetariana o vegana, consuma suficiente proteína, vitamina B12 y ledy remigio el embarazo   Algunas garcia de estos nutrientes que no son carne, son los cereales fortificados, la [facility], productos de soya (tofu y [place] de soya), nueces, granos y legumbres  Estos nutrientes también se [employer] y los productos lácteos  · Antojos:  Usted podría tener antojos de ciertos alimentos remigio el [facility]  Los alimentos que son altos en calorías, grasa y azúcar, no deben reemplazar a los alimentos que son saludables  Algunas mujeres tienen antojos por sustancias inusuales bari el Nadir pavon almidón para ropa, hielo y KRISTI[place] S  Esta condición se conoce bari pica  Pioneer podría conllevar a problemas de fernando bari anemia y provocar otros [address] [place] [address] Pkwy  ¿Cuándo leela comunicarme con mi médico?   · Usted pierde peso sin proponérselo  · Tiene antojos por sustancias bari el Nadir pavon almidón para ropa o hielo  · Usted tiene preguntas o inquietudes acerca de gallagher condición o cuidado  ACUERDOS SOBRE GALLAGHER CUIDADO:   Usted tiene el derecho de ayudar a planear gallagher cuidado  Discuta shona opciones de tratamiento con shona médicos para decidir el cuidado que usted desea recibir  Usted siempre tiene el derecho de rechazar el tratamiento  Esta información es sólo para uso en educación  Gallagher intención no es darle un consejo médico sobre enfermedades o tratamientos  Colsulte con gallagher Kevin Porsche farmacéutico antes de seguir cualquier régimen médico para saber si es seguro y efectivo para usted  © 2017 [address] Information is for End User's use only and may not be sold, redistributed or otherwise used for commercial purposes  All illustrations and images included in CareNotes® are the copyrighted property of A ERYN A M , Inc  or Blair Berman  Embarazo de la semana 7 a la 10   CUIDADO AMBULATORIO:   Qué cambios están ocurriendo en gallagher cuerpo:  La hormonas del embarazo podrían provocar que gallagher cuerpo pase por varios cambios remigio esta etapa del Carlo  Es posible que usted se sienta más cansado de lo normal y que tenga cambios de humor, náuseas y vómitos, y herve de [place]   Shona senos podrían sentirse sensibles e inflamados y usted podría orinar con más frecuencia  Busque atención médica de inmediato si:   · Usted tiene dolor o cólicos en el abdomen o la parte baja de la espalda  · Usted tiene sangrado vaginal abundante o coágulos  · Le sale un material que parece tejido o coágulos grandes  Recolecte el material y tráigalo con usted  Pregúntele a tong Pam Paulson vitaminas y minerales son adecuados para usted  · Usted tiene un sangrado leve  · Usted tiene escalofríos o fiebre  · Usted tiene comezón, ardor o dolor vaginal      · Usted tiene trinh secreción vaginal amarillenta, verdosa, edison o de [employer]  · Usted tiene dolor o ardor al Carren Ovens, orina menos de lo habitual o tiene [place] rosada o sanguinolenta  · Usted tiene preguntas o inquietudes acerca de tong condición o cuidado  Cómo cuidarse en esta etapa de tong embarazo:   · Controle la náusea y el vómito  Evite los alimentos grasosos y picantes  Coma comidas pequeñas remigio el día en vez de porciones grandes  El jengibre puede ayudar a [employer]  Consulte con tong médico acerca de otras formas para disminuir las náuseas y el vómito  · Consuma alimentos saludables y variados  Alimentos saludables incluyen frutas, verduras, panes de carolyn integral, alimentos lácteos bajos en grasa, frijoles, vlad magras y pescado  Cabazon líquidos bari se le haya indicado  Pregunte cuánto líquido debe ryan cada día y cuáles líquidos son los más adecuados para usted  Limite el consumo de cafeína a menos de [place] [address]  Limite el consumo de pescado a 2 porciones cada semana  Escoja pescado con concentraciones bajas de dipesh bari atún al natural enlatado, camarón, salmón, bacalao o tilapia  No  coma pescado con concentraciones altas de dipesh bari pez bernice, magdalena valle, lisa rayado y tiburón  · [id number] [place]    Tong necesidad de ciertas vitaminas y Angella Rangel el ácido fólico, aumenta remigio el [facility]  Las vitaminas prenatales proporcionan algunas de las vitaminas y minerales adicionales que usted necesita  Las vitaminas prenatales también podrían ayudar a disminuir el riesgo de ciertos defectos de nacimiento  · Pregunte cuánto peso usted debería aumentar cada mes  Demasiado aumento de peso o muy poco puede ser poco saludable para usted y pineda bebé  · No fume  Si usted fuma, nunca es demasiado tarde para dejar de hacerlo  Fumar aumenta el riesgo de aborto espontáneo y otros problemas de fernando remigio pineda [facility] [place]  Fumar puede causar que pineda bebé nazca antes de tiempo o que pese menos al nacer  Solicite información a pineda médico si usted necesita ayuda para dejar de fumar  · No consuma alcohol  El alcohol pasa de pineda cuerpo al bebé a través de la placenta  Puede afectar el desarrollo del cerebro de pineda bebé y provocar el síndrome de alcoholismo fetal (SAF)  FAS es un refugio de condiciones que causan [address], de comportamiento y de crecimiento  · Consulte con pineda médico antes de ryan cualquier medicamento  Muchos medicamentos pueden perjudicar a pineda bebé si usted los fatmata [address] [place] [address]  No tome ningún medicamento, vitaminas, hierbas o suplementos sin matheus consultar con pineda Kamla Sella  use drogas ilegales o de la marcum (bari marihuana o cocaína) mientras está embarazada  Consejos de seguridad remigio el embarazo:   · Evite jacuzzis y saunas  No use un jacuzzi o un sauna mientras usted está embarazada, especialmente remigio el primer trimestre  Los [facility] y los saunas aumentan la temperatura de pineda bebé y el riesgo de defectos de nacimiento  · Evite la toxoplasmosis  [place] es trinh infección causada por comer carne cruda o estar cerca del excremento de un pro infectado  [place] puede causar malformaciones congénitas, aborto espontáneo y Vern Schein  Lávese las brad después de tocar carne cruda   Asegúrese de que la carne esté desiree cocida antes de comerla  Evite los huevos crudos y la Melanie [place]  Use guantes o pida que alguien la ayude a limpiar la caja de arena del pro mientras usted Meli Baar  Cambios que están ocurriendo con pineda bebé:  Para las 10 semanas, pineda bebé medirá alrededor de 2 ½ pulgadas desde la mariela hasta la rabadilla (parte inferior o cóccix del bebé)  Pineda bebé pesa alrededor de ½ onza  Los [employer] del cuerpo, bari el cerebro, corazón y pulmones se están formando  Las características faciales de pineda bebé también están comenzando a formarse  Lo que necesita saber acerca del cuidado prenatal:  El cuidado prenatal se trata de trinh serie de visitas con pineda médico a lo tra del embarazo  Remigio las primeras 28 semanas de pineda kala Matos tendrá citas mensuales con pineda médico  El cuidado prenatal puede ayudar a evitar problemas remigio el [facility] y Marialu[place]  Pineda médico le hará preguntas acerca de pineda fernando y de cualquier embarazo previo que usted haya tenido  Él también le preguntará acerca de cualquier medicamento que esté tomando  Es posible que también necesite alguno de los siguientes tratamientos:  · Trinh prueba de [place]  se realiza para revisar si pineda jeannine uterino tiene células anormales  El jeannine uterino es la apertura angosta que está en la parte inferior de [place]  El jeannine uterino se junta con la parte superior de la vagina  · Un examen pélvico  le permite a pineda médico observar pineda jeannine uterino (la parte inferior de pineda [place])  Pineda médico utiliza un espéculo para abrir pineda vagina suavemente  Él examinará el tamaño y forma de pineda útero  · Los análisis de selena:  podrían realizarse para revisar signos de anemia o el tipo de [place]  Pineda médico también podría ordenarle otros exámenes de selena para revisar si usted es inmune a ciertas enfermedades bari la Hepatitis B  También podría recomendarle un examen del VIH      · Análisis de orina  también podrían realizarse para revisar si hay signos de infección  · Pineda presión arterial y peso  será revisado  © 2017 [facility] Information is for End User's use only and may not be sold, redistributed or otherwise used for commercial purposes  All illustrations and images included in CareNotes® are the copyrighted property of A D A M , Inc  or Blair Berman  Esta información es sólo para uso en educación  Pineda intención no es darle un consejo médico sobre enfermedades o tratamientos  Colsulte con pineda Freeda Kraus farmacéutico antes de seguir cualquier régimen médico para saber si es seguro y efectivo para usted  El embarazo de la semana 11 a la 14   CUIDADO AMBULATORIO:   Qué cambios están ocurriendo en pineda cuerpo: Ahora usted está al término del primer trimestre y entrando al karolina trimestre  Los [employer] matutinos por lo general desaparecen para [employer]  Es posible que usted tenga otros síntomas bari fatiga, orinar con frecuencia y herve de [place]  Usted podría twyla [employer] 2 a 4 libras hasta ahora  Busque atención médica de inmediato si:   · Usted tiene dolor o cólicos en el abdomen o la parte baja de la espalda  · Usted tiene sangrado vaginal abundante o coágulos  · Le sale un material que parece tejido o coágulos grandes  Recolecte el material y tráigalo con usted  Pregúntele a pineda Glory Rinne vitaminas y minerales son adecuados para usted  · Usted no puede retener alimentos ni líquidos y está perdiendo [place]  · Usted tiene un sangrado leve  · Usted tiene escalofríos o fiebre  · Usted tiene comezón, ardor o dolor vaginal      · Usted tiene trinh secreción vaginal amarillenta, verdosa, edison o de [employer]  · Usted tiene dolor o ardor al Donalee Dace, orina menos de lo habitual o tiene [place] rosada o sanguinolenta  · Usted tiene preguntas o inquietudes acerca de pineda condición o cuidado  Cómo cuidarse en esta etapa de pineda embarazo:   · Descanse lo suficiente    Es posible que usted se sienta más cansada de lo normal  Usted podría necesitar ryan siestas o acostarse más temprano  · Controle la náusea y el vómito  Evite los alimentos grasosos y picantes  Coma comidas pequeñas remigio el día en vez de porciones grandes  El jengibre puede ayudar a [employer]  Consulte con pineda médico acerca de otras formas para disminuir las náuseas y el vómito  · Consuma alimentos saludables y variados  Alimentos saludables incluyen frutas, verduras, panes de carolyn integral, alimentos lácteos bajos en grasa, frijoles, vlad magras y pescado  [place] líquidos bari se le haya indicado  Pregunte cuánto líquido debe ryan cada día y cuáles líquidos son los más adecuados para usted  Limite el consumo de cafeína a menos de [place] [address]  Limite el consumo de pescado a 2 porciones cada semana  Escoja pescado con concentraciones bajas de dipesh bari atún al natural enlatado, camarón, salmón, bacalao o tilapia  No  coma pescado con concentraciones altas de dipesh bari pez bernice, caballa gigante, pargo rayado y tiburón  · [id number] [address]  Pineda necesidad de ciertas vitaminas y [facility], bari el ácido fólico, aumenta remigio el [facility]  Las vitaminas prenatales proporcionan algunas de las vitaminas y minerales adicionales que usted necesita  Las vitaminas prenatales también podrían ayudar a disminuir el riesgo de ciertos defectos de nacimiento  · No fume  Si usted fuma, nunca es demasiado tarde para dejar de hacerlo  Fumar aumenta el riesgo de aborto espontáneo y otros problemas de fernando remigio pineda [facility]  Fumar puede causar que pineda bebé nazca antes de tiempo o que pese menos al nacer  Solicite información a pineda médico si usted necesita ayuda para dejar de fumar  · No consuma alcohol  El alcohol pasa de pineda cuerpo al bebé a través de la placenta   Puede afectar el desarrollo del cerebro de pineda bebé y provocar el síndrome de alcoholismo fetal (SAF)  FAS es un refugio de condiciones que causan [address], de comportamiento y de crecimiento  · Consulte con tong médico antes de ryan cualquier medicamento  Muchos medicamentos pueden perjudicar a tong bebé si usted los fatmata [address]  No tome ningún medicamento, vitaminas, hierbas o suplementos sin matheus consultar con tong Larence Blaise  use drogas ilegales o de la marcum (bari marihuana o cocaína) mientras está embarazada  Consejos de seguridad alexander el embarazo:   · Evite jacuzzis y saunas  No use un jacuzzi o un sauna mientras usted está embarazada, especialmente alexander el primer trimestre  Los [place] [facility] y los saunas aumentan la temperatura de tong bebé y el riesgo de defectos de nacimiento  · Evite la toxoplasmosis  [place] es trinh infección causada por comer carne cruda o estar cerca del excremento de un pro infectado  [place] puede causar malformaciones congénitas, aborto espontáneo y Vern Schein  Lávese las brad después de tocar carne cruda  Asegúrese de que la carne esté desiree cocida antes de comerla  Evite los huevos crudos y la Natalie Burkitt  Use guantes o pida que alguien la ayude a limpiar la caja de arena del pro mientras usted Santos Barley  Cambios que están ocurriendo con tong bebé: Tong bebé tiene completamente formadas las uñas de rolando brad y pies  Ahora tong latido se puede escuchar  Pregunte a tong médico si usted puede escuchar el latido cardíaco de tong bebé  Para la semana 14, tong bebé mide más de 4 pulgadas desde la punta de la herman hasta la rabadilla (parte inferior del bebé)  Tong bebé pesa más de 3 onzas  Lo que necesita saber acerca del cuidado prenatal:  Alexander las primeras 29 semanas de tong embarazo, usted tendrá citas mensuales con tong médico  El cuidado prenatal puede ayudar a evitar problemas alexander el Sarkar Abide y Marialuisa  Tong médico le revisará tong presión arterial y [place]   Es posible que también necesite alguno de los siguientes tratamientos:  · Un examen de orina  también podría realizarse para revisarle el azúcar y la proteína  Estas son señales de diabetes gestacional o de infección  · Se le pueden ofrecer  pruebas de detección de trastornos genéticos  Estos exámenes de detección revisan el riesgo de tong bebé de trastornos genéticos bari el síndrome de [place]  Los exámenes de detección incluyen un examen de selena y un ultrasonido  · El ritmo cardíaco de tong bebé  será revisado  © 2017 [facility] Information is for End User's use only and may not be sold, redistributed or otherwise used for commercial purposes  All illustrations and images included in CareNotes® are the copyrighted property of A D A M , Inc  or Blair Berman  Esta información es sólo para uso en educación  Tong intención no es darle un consejo médico sobre enfermedades o tratamientos  Colsulte con tong Sylvia Emerson farmacéutico antes de seguir cualquier régimen médico para saber si es seguro y efectivo para usted  Vacuna de refuerzo contra la Difteria/tos ferina/tétanos (Por inyección)   Protege contra las infecciones causadas por el tétanos (trismo), la difteria o la pertusis (tos [place])  Esta es trinh vacuna de refuerzo  Marie(s) : Adacel, Boostrix   Existen muchas otras marcas de Sharona  Valencia medicamento no debe ser usado cuando:   Usted no debe recibir esta vacuna si alguna vez ha tenido trinh reacción alérgica a la vacuna contra el tétanos, la difteria o tos ferina por separado o en combinación  Usted no debe recibir esta vacuna si ha tenido convulsiones, cambios del [place] mental o cualquier otra reacción grave dentro de los 7 días de twyla recibido trinh vacuna contra la tos [place]  Forma de usar valencia medicamento:   Inyectable  · Clarene Leader enfermera u otro médico le administrará valencia medicamento  · Tong médico le recetará tong dosis exacta y le indicará la frecuencia con la que debe administrarse   Valencia medicamento se administra mediante trinh inyección en laci de rolando músculos  · Usted podría recibir otras vacunas al mismo tiempo que reciba [place], bianca en trinh shanell distinta del cuerpo  Usted debe recibir las instrucciones para el paciente para todas las vacunas  Coméntele a pineda médico o enfermera cualquier pregunta que tenga al respecto  · Karen y siga las instrucciones para el paciente que vienen con el medicamento  Hable con pineda médico o farmacéutico si tiene alguna pregunta  Medicamentos y [employer] que debe evitar:   Consulte con pineda médico o farmacéutico antes de usar cualquier medicamento, incluyendo los que compra sin receta médica, las vitaminas y los productos herbales  · Asegúrese de informarle a pineda médico si usted está recibiendo un tratamiento o medicamento que debilita pineda sistema inmunológico  Ésto incluye la radioterapia, medicamentos esteroides (bari dexametasona, hidrocortisona, metilprednisolona, prednisolona, prednisona, Medrol®), o medicamentos contra el cáncer  Precauciones remigio el uso de valencia medicamento:   · Asegúrese que pineda médico sepa si usted está embarazada o lactando o sufre de epilepsia, un sistema inmunológico débil o un antecedente de un derrame cerebral  Informe a pineda médico si usted está enfermo o tiene fiebre  · Avísele a pineda médico acerca de cualquier reacción que usted tenga después de twyla recibido trinh vacuna  Ésto incluye desmayos, convulsiones, trinh fiebre que sobrepasa los 40 5º C (105° F), o enrojecimiento o inflamación severa donde se le aplicó la inyección  Dígale a pineda médico si usted tiene antecedentes del síndrome de Guillain-Barré después de twyla recibido trinh vacuna antitetánica  · Llame a pineda médico inmediatamente si usted se desmaya o tiene [employer] vista, entumecimiento o cosquilleo en rolando brazos, brad o pies o sufre trinh convulsión después de recibir esta vacuna  · Informe a pineda médico si tiene trinh alergia al látex   Las [employer] pueden que contengan caucho de látex natural seco   · Esta vacuna no sirve para tratar [employer]  Si usted tiene Pitney Jan de difteria, tétanos, o tos ferina, necesitará un medicamento para tratar la infección  Efectos secundarios que pueden presentarse remigio el uso de valencia medicamento:   Consulte inmediatamente con el médico si nota cualquiera de estos efectos secundarios:  · Reacción alérgica: Comezón o ronchas, hinchazón del odalys o las brad, hinchazón u hormigueo en la boca o garganta, opresión en el pecho, dificultad para respirar  · Cambios en la visión  · Fiebre por encima de los 39 4 grados C (105 grados F)  · Desvanecimientos o desmayos  · Pérdida de la sensación, hormigueo o ardor en las brad, los brazos, las piernas o los pies  · Convulsiones  · TXU Amy entumecimiento o debilidad en rolando brazos o piernas  · Dolor intenso, enrojecimiento o inflamación donde le aplicaron la inyección  Consulte con el médico si nota los siguientes efectos secundarios menos graves:   · Dolor de herman  · Dolor moderado, enrojecimiento o inflamación donde le aplicaron la inyección  · [place], vómito, diarrea o dolor de estómago  · Cansancio  Consulte con el médico si nota otros efectos secundarios que radha son causados por valencia medicamento  Llame a tong médico para consultarle Dee cMgrath puede notificar rolando efectos secundarios al FDA al [phone]  © 2017 [address] Information is for End User's use only and may not be sold, redistributed or otherwise used for commercial purposes  Esta información es sólo para uso en educación  Tong intención no es darle un consejo médico sobre enfermedades o tratamientos  Colsulte con tong Verito Bjornstad farmacéutico antes de seguir cualquier régimen médico para saber si es seguro y efectivo para usted         Vacuna contra la gripe   CUIDADO AMBULATORIO:   La vacuna contra la influenza  es trinh inyección que se aplica para ayudar en la prevención de la influenza (gripe)  La influenza es causada por un virus  El virus se propaga de persona a persona por medio de la tos y los estornudos  Varios tipos de virus causan la influenza  Debido a que los virus [place] con el [place], es necesaria la producción de nuevas vacunas cada año  La vacuna comienza la protección contra la influenza aproximadamente 2 semanas después de recibirla  La vacuna antigripal suele inyectarse en el brazo  Podría aplicarse en tong muslo  Es posible que le administren trinh vacuna hecha con virus débiles o muertos  Llame al 911 en jay jay de presentar lo siguiente:   · Tong boca y garganta están inflamadas  · Usted tiene sibilancias o dificultad para respirar  · Usted tiene dolor en el pecho o tong corazón está latiendo más rápido de lo que es normal para usted  · Usted siente que se va a desmayar  Busque atención médica de inmediato si:   · Tong odalys está blackmon o inflamado  · Usted tiene urticaria que se propaga por todo tong cuerpo  · Usted se siente débil o mareado  Pregúntele a tong Lester Parkers vitaminas y minerales son adecuados para usted  · Usted tiene [employer], enrojecimiento o inflamación alrededor del área donde le aplicaron la inyección  · Usted tiene preguntas o inquietudes sobre la vacuna contra la influenza  Cuándo ponerse la vacuna contra la influenza:  La vacuna antigripal se ofrece cada año empezando en septiembre u octubre  Se recomienda ser vacunado contra la gripe tan pronto esté disponible  Los [place] ALY[place]IA [employer] 6 meses y 6 años de edad deben recibir 2 dosis remigio el primer año después de twyla recibido la vacuna  Las 2 dosis deben recibirse con trinh diferencia de 4 semanas bari mínimo  Es mejor si se aplica el mismo tipo de vacuna ambas veces  Luego, el masha puede recibir 1 dosis [employer]  Los niños de 9 años o mayores deben recibir 1 dosis [employer]          Quiénes deben recibir la vacuna contra la influenza:   · Bebés de 6 meses o más    · Cualquier adulto saludable a quien le gustaría reducir el riesgo de contraer la gripe    · Cualquier persona que vive con, o provee cuidado a niños menores de 5 años de edad     · Personal en el nico de la fernando    · Cualquier persona que viva en centros de convalecencia de tra plazo    · Cualquier persona que sufra de problemas de fernando crónicos, bari asma, diabetes, o trastornos en la selena    · Cualquier persona con un sistema inmunitario débil    · Mujeres embarazadas o que están planificando quedar embarazadas remigio la temporada de la gripe  Torie Joseph no deben recibir la vacuna contra la influenza:  Si tiene alergia a los [place], pregúntele a pineda médico si es seguro recibir la inyección contra la influenza  Un médico deberá controlarlo de cerca mientras recibe la vacuna y remigio trinh hora o más tras haberla recibido  Torie Joseph no deben recibir la vacuna contra la influenza:  · Bebés menores de 6 meses     · Cualquier persona que haya sufrido trinh reacción alérgica a la vacuna contra la gripe    · Cualquier persona que esté enferma o tenga fiebre    · Cualquier persona que haya recibido un diagnóstico del síndrome de Guillain-[place] dentro de las primeras 6 semanas de twyla recibido trinh vacuna contra la gripe    · Cualquier persona alérgica al timerosal (dipesh)  Los riesgos de la vacuna contra la gripe:  La vacuna contra la influenza podría causar síntomas leves, bari fiebre, dolor de [place] y herve musculares  También es posible que provoque sensibilidad o enrojecimiento de leve a moderado en el área donde le aplicaron la inyección  El aerosol nasal podría causar fiebre, congestión o flujo nasal, dolor de herman, herve musculares o vómito  Usted aún podría contraer la gripe después de recibir la vacuna contra la influenza  Si usted es alérgico a los [place], pregunte acerca de trinh vacuna sin huevo  Usted podría tener trinh reacción alérgica a la vacuna  Bear puede poner en peligro pineda leigh     Acuda a rolando consultas de control con pineda médico según le indicaron  Anote rolando preguntas para que se acuerde de hacerlas remigio rolando visitas  © 2017 26[address] Logan Mariscal Information is for End User's use only and may not be sold, redistributed or otherwise used for commercial purposes  All illustrations and images included in CareNotes® are the copyrighted property of A D A M , Inc  or Blair Berman  Esta información es sólo para uso en educación  Tong intención no es darle un consejo médico sobre enfermedades o tratamientos  Colsulte con tong Chantale Antis farmacéutico antes de seguir cualquier régimen médico para saber si es seguro y efectivo para usted  TDAP:  Vacuna de refuerzo contra la Difteria/tos ferina/tétanos (Por inyección)   Protege contra las infecciones causadas por el tétanos (trismo), la difteria o la pertusis (tos [place])  Esta es trinh vacuna de refuerzo  Marie(s) : Adacel, Boostrix   Existen muchas otras marcas de Sharona  Valencia medicamento no debe ser usado cuando:   Usted no debe recibir esta vacuna si alguna vez ha tenido trinh reacción alérgica a la vacuna contra el tétanos, la difteria o tos ferina por separado o en combinación  Usted no debe recibir esta vacuna si ha tenido convulsiones, cambios del [place]yril mental o cualquier otra reacción grave dentro de los 7 días de twyla recibido trinh vacuna contra la tos [place]  Forma de usar valencia medicamento:   Inyectable  · Neeraj Sj enfermera u otro médico le administrará valencia medicamento  · Tong médico le recetará tong dosis exacta y le indicará la frecuencia con la que debe administrarse  Valencia medicamento se administra mediante trinh inyección en laci de rolando músculos  · Usted podría recibir otras vacunas al mismo tiempo que reciba Bernaeb, bianca en trinh shanell distinta del cuerpo  Usted debe recibir las instrucciones para el paciente para todas las vacunas  Coméntele a tong médico o enfermera cualquier pregunta que tenga al respecto    · Karen y siga las instrucciones para el paciente que vienen con el medicamento  Hable con pineda médico o farmacéutico si tiene alguna pregunta  Medicamentos y [employer] que debe evitar:   Consulte con pineda médico o farmacéutico antes de usar cualquier medicamento, incluyendo los que compra sin receta médica, las vitaminas y los productos herbales  · Asegúrese de informarle a pineda médico si usted está recibiendo un tratamiento o medicamento que debilita pineda sistema inmunológico  Ésto incluye la radioterapia, medicamentos esteroides (bari dexametasona, hidrocortisona, metilprednisolona, prednisolona, prednisona, Medrol®), o medicamentos contra el cáncer  Precauciones remigio el uso de valencia medicamento:   · Asegúrese que pineda médico sepa si usted está embarazada o lactando o sufre de epilepsia, un sistema inmunológico débil o un antecedente de un derrame cerebral  Informe a pineda médico si usted está enfermo o tiene fiebre  · Avísele a pineda médico acerca de cualquier reacción que usted tenga después de wtyla recibido trinh vacuna  Ésto incluye desmayos, convulsiones, trinh fiebre que sobrepasa los 40 5º C (105° F), o enrojecimiento o inflamación severa donde se le aplicó la inyección  Dígale a pineda médico si usted tiene antecedentes del síndrome de Guillain-Barré después de twyla recibido trinh vacuna antitetánica  · Llame a pineda médico inmediatamente si usted se desmaya o tiene [employer] vista, entumecimiento o cosquilleo en rolando brazos, brad o pies o sufre trinh convulsión después de recibir esta vacuna  · Informe a pineda médico si tiene trinh alergia al látex  Las jeringas pueden que contengan caucho de látex natural seco   · Esta vacuna no sirve para tratar [employer]  Si usted tiene Pitney Jan de difteria, tétanos, o tos ferina, necesitará un medicamento para tratar la infección    Efectos secundarios que pueden presentarse remigio el uso de valencia medicamento:   Consulte inmediatamente con el médico si nota cualquiera de estos efectos secundarios:  · Reacción alérgica: Camernestinea Emeterio o ronchas, hinchazón del odalys o las [address], hinchazón u hormigueo en la boca o garganta, opresión en el pecho, dificultad para respirar  · Cambios en la visión  · Fiebre por encima de los 39 4 grados C (105 grados F)  · Desvanecimientos o desmayos  · Pérdida de la sensación, hormigueo o ardor en las brad, los brazos, las piernas o los pies  · Convulsiones  · TXU Amy entumecimiento o debilidad en rolando brazos o piernas  · Dolor intenso, enrojecimiento o inflamación donde le aplicaron la inyección  Consulte con el médico si nota los siguientes efectos secundarios menos graves:   · Dolor de herman  · Dolor moderado, enrojecimiento o inflamación donde le aplicaron la inyección  · [place], vómito, diarrea o dolor de estómago  · Cansancio  Consulte con el médico si nota otros efectos secundarios que radha son causados por valencia medicamento  Llame a pineda médico para consultarle Dee Mcgrath puede notificar rolando efectos secundarios al FDA al [phone]  © 2017 [facility] Information is for End User's use only and may not be sold, redistributed or otherwise used for commercial purposes  Esta información es sólo para uso en educación  Pineda intención no es darle un consejo médico sobre enfermedades o tratamientos  Colsulte con pineda Gala Primer farmacéutico antes de seguir cualquier régimen médico para saber si es seguro y efectivo para kala

## 2019-02-26 NOTE — LETTER
Proof of Pregnancy Letter    Giulia Reyes  2000  [address] [facility]        02/26/19      Stanley is a patient at our facility  Stanley Estimated Date of Delivery: 8/19/19       Any questions or concerns, please feel free to contact our office      Sincerely,     [facility]

## 2019-02-26 NOTE — PROGRESS NOTES
OB Intake              o Patient presents for OB intake interview  o Accompanied by: Lilo Khalil  o FOB:  - Involved:no, Planned pregnancy:no      o     o LMP:   Patient's last menstrual period was 2018   o U/S date: 2019     o Estimated Date of Delivery: 19    - confirmed by LMP and U/S    o Signs and Symptoms of pregnancy:          - Constipation: no  - Headaches:no  - Cramping/spotting: no  - PICA cravings: no  - Diabetes: If you answer yes, please order 1 hr gtt testing, 50grams   History of gestational diabetes    no   BMI >35     no   Advance maternal age >35   no   First degree relative with type 2 diabetes    no   History of PCOS   no   Current metformin use    no   Prior history of macrosomia or LGA    no  o Immunization Record  -   - There is no immunization history on file for this patient   o Tdap:  - Counseled to be given after 28 weeks  o Influenza vaccine discussed  o MRSA questionnaire:     negative  o Dental visit within last 6 months- no, recommendations discussed  Interview education:  Educational material provided on After Visit Summary (AVS)   Handouts given at todays visit  o Bro Chiang & me phone application guide  o [facility] Baby & Me support center  o CDCs Response to [address] [place] [address] Maternal Fetal Medicine    - Cystic fibrosis pamphlet  o GILL letter given    - Aqq[address] 23  - Dentist    [facility]: yes  MyChart information given  Interview done by: Cornel Hoff RN 19                1  14 weeks gestation of pregnancy      -Prenatal panel

## 2019-02-26 NOTE — LETTER
[facility] Letter    Barbara Reyes  2000  [address]       02/26/19          Anayeli Gomez is a patient and under our care in our office  Donna Reyes's Estimated Date of Delivery: 8/19/19  Any questions or concerns feel free to contact our office       Thank you,    [address] [facility],[address]  [id number] [facility]/Jassi May 15  [facility] [place]  [phone]

## 2019-04-10 ENCOUNTER — ROUTINE PRENATAL (OUTPATIENT)
Dept: PERINATAL CARE | Facility: OTHER | Age: 19
End: 2019-04-10
Payer: COMMERCIAL

## 2019-04-10 VITALS
HEIGHT: 63 IN | SYSTOLIC BLOOD PRESSURE: 108 MMHG | HEART RATE: 89 BPM | BODY MASS INDEX: 22.15 KG/M2 | WEIGHT: 125 LBS | DIASTOLIC BLOOD PRESSURE: 69 MMHG

## 2019-04-10 DIAGNOSIS — Z3A.21 21 WEEKS GESTATION OF PREGNANCY: ICD-10-CM

## 2019-04-10 DIAGNOSIS — Z33.1 ADOLESCENT PREGNANCY, INCIDENTAL: Primary | ICD-10-CM

## 2019-04-10 DIAGNOSIS — Z36.9 ENCOUNTER FOR FETAL ULTRASOUND: ICD-10-CM

## 2019-04-10 PROCEDURE — 76817 TRANSVAGINAL US OBSTETRIC: CPT | Performed by: OBSTETRICS & GYNECOLOGY

## 2019-04-10 PROCEDURE — 76811 OB US DETAILED SNGL FETUS: CPT | Performed by: OBSTETRICS & GYNECOLOGY

## 2019-05-02 ENCOUNTER — ULTRASOUND (OUTPATIENT)
Dept: PERINATAL CARE | Facility: OTHER | Age: 19
End: 2019-05-02
Payer: COMMERCIAL

## 2019-05-02 VITALS
HEART RATE: 103 BPM | DIASTOLIC BLOOD PRESSURE: 62 MMHG | HEIGHT: 63 IN | SYSTOLIC BLOOD PRESSURE: 98 MMHG | WEIGHT: 131.4 LBS | BODY MASS INDEX: 23.28 KG/M2

## 2019-05-02 DIAGNOSIS — Z33.1 ADOLESCENT PREGNANCY, INCIDENTAL: ICD-10-CM

## 2019-05-02 DIAGNOSIS — Z36.9 ENCOUNTER FOR FETAL ULTRASOUND: ICD-10-CM

## 2019-05-02 DIAGNOSIS — Z3A.24 24 WEEKS GESTATION OF PREGNANCY: ICD-10-CM

## 2019-05-02 PROCEDURE — 76816 OB US FOLLOW-UP PER FETUS: CPT | Performed by: OBSTETRICS & GYNECOLOGY

## 2019-05-16 ENCOUNTER — APPOINTMENT (OUTPATIENT)
Dept: LAB | Facility: HOSPITAL | Age: 19
End: 2019-05-16
Attending: NURSE PRACTITIONER
Payer: COMMERCIAL

## 2019-05-16 ENCOUNTER — INITIAL PRENATAL (OUTPATIENT)
Dept: OBGYN CLINIC | Facility: CLINIC | Age: 19
End: 2019-05-16

## 2019-05-16 VITALS
SYSTOLIC BLOOD PRESSURE: 100 MMHG | DIASTOLIC BLOOD PRESSURE: 60 MMHG | HEIGHT: 63 IN | BODY MASS INDEX: 23.96 KG/M2 | WEIGHT: 135.2 LBS

## 2019-05-16 DIAGNOSIS — Z3A.26 26 WEEKS GESTATION OF PREGNANCY: ICD-10-CM

## 2019-05-16 DIAGNOSIS — Z3A.26 26 WEEKS GESTATION OF PREGNANCY: Primary | ICD-10-CM

## 2019-05-16 PROBLEM — Z13.79 GENETIC SCREENING: Status: ACTIVE | Noted: 2019-05-16

## 2019-05-16 LAB
ABO GROUP BLD: NORMAL
BACTERIA UR QL AUTO: ABNORMAL /HPF
BASOPHILS # BLD AUTO: 0 THOUSANDS/ΜL (ref 0–0.1)
BASOPHILS NFR BLD AUTO: 0 % (ref 0–1)
BILIRUB UR QL STRIP: NEGATIVE
BLD GP AB SCN SERPL QL: NEGATIVE
CLARITY UR: CLEAR
COLOR UR: ABNORMAL
EOSINOPHIL # BLD AUTO: 0.1 THOUSAND/ΜL (ref 0–0.4)
EOSINOPHIL NFR BLD AUTO: 1 % (ref 0–6)
ERYTHROCYTE [DISTWIDTH] IN BLOOD BY AUTOMATED COUNT: 13.2 %
GLUCOSE 1H P 50 G GLC PO SERPL-MCNC: 96 MG/DL
GLUCOSE UR STRIP-MCNC: NEGATIVE MG/DL
HCT VFR BLD AUTO: 34 % (ref 36–46)
HGB BLD-MCNC: 11.2 G/DL (ref 12–16)
HGB UR QL STRIP.AUTO: NEGATIVE
KETONES UR STRIP-MCNC: NEGATIVE MG/DL
LEUKOCYTE ESTERASE UR QL STRIP: 500
LYMPHOCYTES # BLD AUTO: 1.3 THOUSANDS/ΜL (ref 0.5–4)
LYMPHOCYTES NFR BLD AUTO: 13 % (ref 25–45)
MCH RBC QN AUTO: 32.8 PG (ref 26–34)
MCHC RBC AUTO-ENTMCNC: 33 G/DL (ref 31–36)
MCV RBC AUTO: 99 FL (ref 80–100)
MONOCYTES # BLD AUTO: 1 THOUSAND/ΜL (ref 0.2–0.9)
MONOCYTES NFR BLD AUTO: 10 % (ref 1–10)
NEUTROPHILS # BLD AUTO: 7.3 THOUSANDS/ΜL (ref 1.8–7.8)
NEUTS SEG NFR BLD AUTO: 76 % (ref 45–65)
NITRITE UR QL STRIP: NEGATIVE
NON-SQ EPI CELLS URNS QL MICRO: ABNORMAL /HPF
PH UR STRIP.AUTO: 7 [PH]
PLATELET # BLD AUTO: 228 THOUSANDS/UL (ref 150–450)
PMV BLD AUTO: 9 FL (ref 8.9–12.7)
PROT UR STRIP-MCNC: NEGATIVE MG/DL
RBC # BLD AUTO: 3.42 MILLION/UL (ref 4–5.2)
RBC #/AREA URNS AUTO: ABNORMAL /HPF
RH BLD: POSITIVE
SP GR UR STRIP.AUTO: 1 (ref 1–1.04)
SPECIMEN EXPIRATION DATE: NORMAL
UROBILINOGEN UA: NEGATIVE MG/DL
WBC # BLD AUTO: 9.6 THOUSAND/UL (ref 4.5–11)
WBC #/AREA URNS AUTO: ABNORMAL /HPF

## 2019-05-16 PROCEDURE — 86803 HEPATITIS C AB TEST: CPT

## 2019-05-16 PROCEDURE — 82950 GLUCOSE TEST: CPT

## 2019-05-16 PROCEDURE — 87086 URINE CULTURE/COLONY COUNT: CPT

## 2019-05-16 PROCEDURE — 81001 URINALYSIS AUTO W/SCOPE: CPT

## 2019-05-16 PROCEDURE — 80081 OBSTETRIC PANEL INC HIV TSTG: CPT

## 2019-05-16 PROCEDURE — 36415 COLL VENOUS BLD VENIPUNCTURE: CPT

## 2019-05-16 PROCEDURE — 87591 N.GONORRHOEAE DNA AMP PROB: CPT | Performed by: NURSE PRACTITIONER

## 2019-05-16 PROCEDURE — 99213 OFFICE O/P EST LOW 20 MIN: CPT | Performed by: NURSE PRACTITIONER

## 2019-05-16 PROCEDURE — 87491 CHLMYD TRACH DNA AMP PROBE: CPT | Performed by: NURSE PRACTITIONER

## 2019-05-16 PROCEDURE — 83020 HEMOGLOBIN ELECTROPHORESIS: CPT

## 2019-05-16 RX ORDER — PNV NO.95/FERROUS FUM/FOLIC AC 28MG-0.8MG
1 TABLET ORAL DAILY
Qty: 90 TABLET | Refills: 4 | Status: SHIPPED | OUTPATIENT
Start: 2019-05-16

## 2019-05-17 LAB
BACTERIA UR CULT: NORMAL
C TRACH DNA SPEC QL NAA+PROBE: NEGATIVE
HBV SURFACE AG SER QL: NORMAL
HCV AB SER QL: NORMAL
HIV 1+2 AB+HIV1 P24 AG SERPL QL IA: NORMAL
N GONORRHOEA DNA SPEC QL NAA+PROBE: NEGATIVE
RPR SER QL: NORMAL
RUBV IGG SERPL IA-ACNC: 142.2 IU/ML

## 2019-05-20 LAB
DEPRECATED HGB OTHER BLD-IMP: 0 %
HGB A MFR BLD: 2.4 % (ref 1.8–3.2)
HGB A MFR BLD: 97.6 % (ref 96.4–98.8)
HGB C MFR BLD: 0 %
HGB F MFR BLD: 0 % (ref 0–2)
HGB FRACT BLD-IMP: NORMAL
HGB S BLD QL SOLY: NEGATIVE
HGB S MFR BLD: 0 %

## 2019-06-03 ENCOUNTER — ROUTINE PRENATAL (OUTPATIENT)
Dept: OBGYN CLINIC | Facility: CLINIC | Age: 19
End: 2019-06-03

## 2019-06-03 VITALS
WEIGHT: 135.4 LBS | SYSTOLIC BLOOD PRESSURE: 110 MMHG | BODY MASS INDEX: 23.99 KG/M2 | HEIGHT: 63 IN | DIASTOLIC BLOOD PRESSURE: 60 MMHG

## 2019-06-03 DIAGNOSIS — Z3A.29 29 WEEKS GESTATION OF PREGNANCY: Primary | ICD-10-CM

## 2019-06-03 PROCEDURE — 90715 TDAP VACCINE 7 YRS/> IM: CPT

## 2019-06-03 PROCEDURE — 90471 IMMUNIZATION ADMIN: CPT

## 2019-06-03 PROCEDURE — 99215 OFFICE O/P EST HI 40 MIN: CPT | Performed by: NURSE PRACTITIONER

## 2019-06-18 ENCOUNTER — ROUTINE PRENATAL (OUTPATIENT)
Dept: OBGYN CLINIC | Facility: CLINIC | Age: 19
End: 2019-06-18

## 2019-06-18 VITALS
BODY MASS INDEX: 24.45 KG/M2 | HEIGHT: 63 IN | SYSTOLIC BLOOD PRESSURE: 110 MMHG | WEIGHT: 138 LBS | DIASTOLIC BLOOD PRESSURE: 60 MMHG

## 2019-06-18 DIAGNOSIS — Z3A.31 31 WEEKS GESTATION OF PREGNANCY: Primary | ICD-10-CM

## 2019-06-18 PROCEDURE — 99215 OFFICE O/P EST HI 40 MIN: CPT | Performed by: NURSE PRACTITIONER

## 2019-07-02 ENCOUNTER — ROUTINE PRENATAL (OUTPATIENT)
Dept: OBGYN CLINIC | Facility: CLINIC | Age: 19
End: 2019-07-02

## 2019-07-02 VITALS
WEIGHT: 141.2 LBS | HEIGHT: 63 IN | BODY MASS INDEX: 25.02 KG/M2 | SYSTOLIC BLOOD PRESSURE: 120 MMHG | DIASTOLIC BLOOD PRESSURE: 60 MMHG

## 2019-07-02 DIAGNOSIS — Z3A.33 33 WEEKS GESTATION OF PREGNANCY: Primary | ICD-10-CM

## 2019-07-02 PROCEDURE — 99213 OFFICE O/P EST LOW 20 MIN: CPT | Performed by: OBSTETRICS & GYNECOLOGY

## 2019-07-02 NOTE — PROGRESS NOTES
Rachael Gay presents today for routine OB visit at 33w1d  /60   Ht 5' 3" (1 6 m)   Wt 64 kg (141 lb 3 2 oz)   LMP 11/20/2018   BMI 25 01 kg/m²   She reports no complaints  Denies uterine contractions  Denies vaginal bleeding or leaking of fluid  Reports adequate fetal movement of at least 10 movements in 2 hours once daily  Third trimester bloodwork  wnl  Vaccinations: received tdap  Scheduled for next ultrasound none  Reviewed premature labor precautions and fetal kick counts  Advised to continue prenatal vitamins and return in 2 weeks      G1 Problems (from 02/18/19 to present)     Problem Noted Resolved    Genetic screening 5/16/2019 by ALEJANDRO Gar No    Overview Signed 5/16/2019  1:14 PM by Marcello Brownlee     Presented too late for screening         Fetal ultrasound 2/20/2019 by ALEJANDRO Gar No    Overview Addendum 5/16/2019  1:13 PM by ALEJANDRO Gar     2/19/19 (14w1d) EDC confirmed, NT WNL  4/10/19 (21w2d) no previa, lorena WNL w/missed views  5/2/19/ (24w3d) lorena WNL & complete

## 2019-07-16 ENCOUNTER — ROUTINE PRENATAL (OUTPATIENT)
Dept: OBGYN CLINIC | Facility: CLINIC | Age: 19
End: 2019-07-16

## 2019-07-16 VITALS
HEIGHT: 63 IN | SYSTOLIC BLOOD PRESSURE: 110 MMHG | BODY MASS INDEX: 24.49 KG/M2 | WEIGHT: 138.2 LBS | DIASTOLIC BLOOD PRESSURE: 60 MMHG

## 2019-07-16 DIAGNOSIS — Z3A.35 35 WEEKS GESTATION OF PREGNANCY: Primary | ICD-10-CM

## 2019-07-16 PROCEDURE — 99215 OFFICE O/P EST HI 40 MIN: CPT | Performed by: NURSE PRACTITIONER

## 2019-07-16 NOTE — PATIENT INSTRUCTIONS
JONES DE PARTO     * Necesito llamar inmediatamente yo si tengo incluso trinh pequeña cantidad de LIQUIDO se escapa de mi vagina, con o sin contracciones  * Dilcia llamar si tengo SANGRADO trinh cantidad igual o más que un período  Vicky Dingwall cantidad de flujo vaginal sangriento es normal al final del embarazo  * Dilcia llamar si tengo CONTRACCIONES cada calixto minutos remigio al [employer]  Necesito un reloj para tiempo  Yo dilcia tiempo desde el comienzo de trinh contracción hasta el comienzo de la siguiente  * De ser necesario ANTES DE  ir al hospital    * Necesito tener un plan para TRANSPORTE a ir al hospital cuando estoy en Bimal Pitch de Artemio  Trabajo generalmente no es trinh emergencia que requiere trinh ambulancia  CUENTAS DEL RETROCESO FETAL    En el tercer trimestre (después de 28 semanas de gestación) deben realizar patada fetal cuentas cada día  Pineda bebé debe moverse al menos 10 veces en 2 horas remigio un tieo [place], trinh vez al día  Elegir el atime del día cuando el bebé es [place]  Trate de hacerlo a la misma hora cada día  Entrar en trinh posición cómoda y luego escribir el tiempo que tu bebé se mueve matheus  Cuenta cada movimiento hasta que el bebé se mueve 10 veces  Estos movimientos incluyen patadas, puñetazos, codazos, revolotea o rollos  [place] puede tardar entre 5 minutos a 2 horas  Anote el tiempo que sientes movimiento 10 del bebé  Si ha pasado 2 horas y tu bebé no se ha movido al menos 10 veces, usted debe llamar a la oficina de inmediato  MASAJE EN LA LOR PERINEAL O VAGINAL    Que puedo hacer ahora para reducir las probabilidades de desgarro remigio el parto? Masaje alrededor del orificio de la vagina realizado por usted misma (o por pineda ana)  El masaje en esta lor, ya sea antes del parto o remigio la segunda etapa del parto, [place] reducir la probabilidad de desgarro perineal remigio el parto    Asimismo, el uso de compresas tibias en el perineo remigio la etapa expulsiva del parto puede reducir la pravedad del desparro  [employer] sucedera remigio la etapa expulsiva del parto  En casa tambien puede reducir las probabilidades de sufrir lesiones durnate el parto de con masajes en la shanell perineal     [facility]? Todas las mujeres embarazadas a partir de, [place], las 34 semanas de [facility]  Cuando? Usted o pineda ana deben hacer masajes en la shanell vaginal remigio 5 a 10 minutos de 1 a 4 veces por semana  [place]? Use trinh mezcla de agua y aceite de Franco, beverly u tsai con 1 o 2 dedos (rivera la comodidad)  Inserte los dedos en la vagina entre 3 y 5cm  Aplique presion general hacia abajo y [employer] costados remigio 5 a [address] 3 y las 9 horas, de 1 a 4 veces por semana  GROUP B STREP    Group B Strep (GBS) es trinh bacteria vaginal común  Aproximadamente el 25% de las mujeres normalmente tienen la bacteria GBS en la vagina  NO es trinh infección de transmisión sexual  ¡De hecho, no es trinh infección! Es solo trinh bacteria vaginal normal para muchas mujeres  Sin embargo, la bacteria GBS puede ser peligrosas para un bebé recién nacido si el bebé está expuesto a lolis bacteria particular remigio el parto y el nacimiento Y desarrolla trinh infección de la bacteria  La probabilidad de trinh infección de GBS en recién nacidos para trinh mandy que tiene las bacteria GBS en la vagina es cerca de 1% - 2%  Pat si la infeccion produce, un bebé podría ser gravemente enfermo  Por esta razón, hacemos un cultivo vaginal (Q-Tip de la vagina y el recto) para todas las mujeres embarazadas en el aproximadamente 39 semanas de [place]  Si el cultivo demuestra que hay bacteria GBS presente, no es trinh razón para el pánico! [employer] en esta situación dará valencia antibióticos de la mandy a través de pineda IV mientras está en parto  Cuando trinh madre se trata con antibióticos remigio el parto, pineda bebé DAVID NUNCA se infecta con la bacteria GBS

## 2019-07-16 NOTE — PROGRESS NOTES
Juan José Cam presents today for routine OB visit at 35w1d  /60   Ht 5' 3" (1 6 m)   Wt 62 7 kg (138 lb 3 2 oz)   BMI 24 48 kg/m²   She reports no complaints  Denies uterine contractions  Denies vaginal bleeding or leaking of fluid  Reports adequate fetal movement of at least 10 movements in 2 hours once daily  Third trimester bloodwork completed  Vaccinations: completed  No further ultrasounds are scheduled or indicated at this time  Reviewed premature labor precautions and fetal kick counts  Advised to continue prenatal vitamins and return in 1 week        G1 Problems (from 02/18/19 to present)     Problem Noted Resolved    Genetic screening 5/16/2019 by ALEJANDRO Alvarez No    Overview Signed 5/16/2019  1:14 PM by Samir Cooper     Presented too late for screening         Fetal ultrasound 2/20/2019 by ALEJANDRO Alvarez No    Overview Addendum 5/16/2019  1:13 PM by ALEJANDRO Alvarez     2/19/19 (14w1d) EDC confirmed, NT WNL  4/10/19 (21w2d) no previa, lorena WNL w/missed views  5/2/19/ (24w3d) lorena WNL & complete

## 2019-07-23 ENCOUNTER — ROUTINE PRENATAL (OUTPATIENT)
Dept: OBGYN CLINIC | Facility: CLINIC | Age: 19
End: 2019-07-23

## 2019-07-23 VITALS
BODY MASS INDEX: 24.59 KG/M2 | DIASTOLIC BLOOD PRESSURE: 60 MMHG | SYSTOLIC BLOOD PRESSURE: 100 MMHG | WEIGHT: 138.8 LBS | HEIGHT: 63 IN

## 2019-07-23 DIAGNOSIS — Z3A.36 36 WEEKS GESTATION OF PREGNANCY: Primary | ICD-10-CM

## 2019-07-23 PROCEDURE — 87491 CHLMYD TRACH DNA AMP PROBE: CPT | Performed by: NURSE PRACTITIONER

## 2019-07-23 PROCEDURE — 87591 N.GONORRHOEAE DNA AMP PROB: CPT | Performed by: NURSE PRACTITIONER

## 2019-07-23 PROCEDURE — 99215 OFFICE O/P EST HI 40 MIN: CPT | Performed by: NURSE PRACTITIONER

## 2019-07-23 PROCEDURE — 87653 STREP B DNA AMP PROBE: CPT | Performed by: NURSE PRACTITIONER

## 2019-07-23 NOTE — PATIENT INSTRUCTIONS
JONES DE PARTO     * Necesito llamar inmediatamente yo si tengo incluso trinh pequeña cantidad de LIQUIDO se escapa de mi vagina, con o sin contracciones  * Dilcia llamar si tengo SANGRADO trinh cantidad igual o más que un período  [place] cantidad de flujo vaginal sangriento es normal al final del embarazo  * Dilcia llamar si tengo CONTRACCIONES cada calixto minutos remigio al [employer]  Necesito un reloj para tiempo  Yo dilcia tiempo desde el comienzo de trinh contracción hasta el comienzo de la siguiente  * De ser necesario ANTES DE  ir al hospital    * Necesito tener un plan para TRANSPORTE a ir al hospital cuando estoy en [place]  Trabajo generalmente no es trinh emergencia que requiere trinh ambulancia  CUENTAS DEL RETROCESO FETAL    En el tercer trimestre (después de 28 semanas de gestación) deben realizar patada fetal cuentas cada día  Pineda bebé debe moverse al menos 10 veces en 2 horas remigio un tieo [place], trinh vez al día  Elegir el atime del día cuando el bebé es [place]  Trate de hacerlo a la misma hora cada día  Entrar en trinh posición cómoda y luego escribir el tiempo que tu bebé se mueve matheus  Cuenta cada movimiento hasta que el bebé se mueve 10 veces  Estos movimientos incluyen patadas, puñetazos, codazos, revolotea o rollos  [place] puede tardar entre 5 minutos a 2 horas  Anote el tiempo que sientes movimiento 10 del bebé  Si ha pasado 2 horas y tu bebé no se ha movido al menos 10 veces, usted debe llamar a la oficina de inmediato  MASAJE EN LA LOR PERINEAL O VAGINAL    Que puedo hacer ahora para reducir las probabilidades de desgarro remigio el parto? Masaje alrededor del orificio de la vagina realizado por usted misma (o por pineda ana)  El masaje en esta lor, ya sea antes del parto o remigio la segunda etapa del parto, [place] reducir la probabilidad de desgarro perineal remigio el parto    Asimismo, el uso de compresas tibias en el perineo remigio la etapa expulsiva del parto puede reducir la pravedad del desparro  [place] sucedera remigio la etapa expulsiva del parto  En casa tambien puede reducir las probabilidades de sufrir lesiones durnate el parto de con masajes en la shanell perineal     [place]? Todas las mujeres embarazadas a partir de, [place], las [facility]  Cuando? Usted o pineda ana deben hacer masajes en la shanell vaginal remigio 5 a 10 minutos de 1 a 4 veces por semana  [place]? Use trinh mezcla de agua y aceite de Franco, giacomo u tsai con 1 o 2 dedos (rivera la comodidad)  Inserte los dedos en la vagina entre 3 y 5cm  Aplique presion general hacia abajo y [employer] remigio 5 a [address] 3 y las 9 horas, de 1 a 4 veces por semana  GROUP B STREP    Group B Strep (GBS) es trinh bacteria vaginal común  Aproximadamente el 25% de las mujeres normalmente tienen la bacteria GBS en la vagina  NO es trinh infección de transmisión sexual  ¡De hecho, no es trinh infección! Es solo trinh bacteria vaginal normal para muchas mujeres  Sin embargo, la bacteria GBS puede ser peligrosas para un bebé recién nacido si el bebé está expuesto a lolis bacteria particular remigio el parto y el nacimiento Y desarrolla trinh infección de la bacteria  La probabilidad de trinh infección de GBS en recién nacidos para trinh mandy que tiene las bacteria GBS en la vagina es cerca de 1% - 2%  Pat si la infeccion produce, un bebé podría ser gravemente enfermo  Por esta razón, hacemos un cultivo vaginal (Q-Tip de la vagina y el recto) para todas las mujeres embarazadas en el aproximadamente 39 semanas de [place]  Si el cultivo demuestra que hay bacteria GBS presente, no es trinh razón para el pánico! [employer] en esta situación dará valencia antibióticos de la mandy a través de pineda IV mientras está en parto  Cuando trinh madre se trata con antibióticos remigio el parto, pineda bebé DAVID NUNCA se infecta con la bacteria GBS

## 2019-07-24 LAB
C TRACH DNA SPEC QL NAA+PROBE: NEGATIVE
N GONORRHOEA DNA SPEC QL NAA+PROBE: NEGATIVE

## 2019-07-25 LAB — GP B STREP DNA SPEC QL NAA+PROBE: NORMAL

## 2019-07-30 ENCOUNTER — ROUTINE PRENATAL (OUTPATIENT)
Dept: OBGYN CLINIC | Facility: CLINIC | Age: 19
End: 2019-07-30

## 2019-07-30 VITALS
BODY MASS INDEX: 24.77 KG/M2 | HEIGHT: 63 IN | DIASTOLIC BLOOD PRESSURE: 60 MMHG | SYSTOLIC BLOOD PRESSURE: 110 MMHG | WEIGHT: 139.8 LBS

## 2019-07-30 DIAGNOSIS — Z3A.37 37 WEEKS GESTATION OF PREGNANCY: Primary | ICD-10-CM

## 2019-07-30 PROCEDURE — 99215 OFFICE O/P EST HI 40 MIN: CPT | Performed by: NURSE PRACTITIONER

## 2019-07-30 NOTE — PATIENT INSTRUCTIONS
JONES DE PARTO     * Necesito llamar inmediatamente yo si tengo incluso trinh pequeña cantidad de LIQUIDO se escapa de mi vagina, con o sin contracciones  * Dilcia llamar si tengo SANGRADO trinh cantidad igual o más que un período  [place] cantidad de flujo vaginal sangriento es normal al final del embarazo  * Dilcia llamar si tengo CONTRACCIONES cada calixto minutos remigio al [employer]  Necesito un reloj para tiempo  Yo dilcia tiempo desde el comienzo de trinh contracción hasta el comienzo de la siguiente  * De ser necesario ANTES DE  ir al hospital    * Necesito tener un plan para TRANSPORTE a ir al hospital cuando estoy en [place]  Trabajo generalmente no es trinh emergencia que requiere trinh ambulancia  CUENTAS DEL RETROCESO FETAL    En el tercer trimestre (después de 28 semanas de gestación) deben realizar patada fetal cuentas cada día  Pineda bebé debe moverse al menos 10 veces en 2 horas remigio un tieo [place], trinh vez al día  Elegir el atime del día cuando el bebé es [place]  Trate de hacerlo a la misma hora cada día  Entrar en trinh posición cómoda y luego escribir el tiempo que tu bebé se mueve matheus  Cuenta cada movimiento hasta que el bebé se mueve 10 veces  Estos movimientos incluyen patadas, puñetazos, codazos, revolotea o rollos  [place] puede tardar entre 5 minutos a 2 horas  Anote el tiempo que sientes movimiento 10 del bebé  Si ha pasado 2 horas y tu bebé no se ha movido al menos 10 veces, usted debe llamar a la oficina de inmediato  MASAJE EN LA LOR PERINEAL O VAGINAL    Que puedo hacer ahora para reducir las probabilidades de desgarro remigio el parto? Masaje alrededor del orificio de la vagina realizado por usted misma (o por pineda ana)  El masaje en esta lor, ya sea antes del parto o remigio la segunda etapa del parto, [place] reducir la probabilidad de desgarro perineal remigio el parto    Asimismo, el uso de compresas tibias en el perineo remigio la etapa expulsiva del parto puede reducir la pravedad del desparro  [place] sucedera remigio la etapa expulsiva del parto  En casa tambien puede reducir las probabilidades de sufrir lesiones durnate el parto de con masajes en la shanell perineal     [place]? Todas las mujeres embarazadas a partir de, [place], las [facility]  Cuando? Usted o pineda ana deben hacer masajes en la shanell vaginal remigio 5 a 10 minutos de 1 a 4 veces por semana  [place]? Use trinh mezcla de agua y aceite de Franco, giacomo u tsai con 1 o 2 dedos (rivera la comodidad)  Inserte los dedos en la vagina entre 3 y 5cm  Aplique presion general hacia abajo y [employer] remigio 5 a [address] 3 y las 9 horas, de 1 a 4 veces por semana  GROUP B STREP    Group B Strep (GBS) es trinh bacteria vaginal común  Aproximadamente el 25% de las mujeres normalmente tienen la bacteria GBS en la vagina  NO es trinh infección de transmisión sexual  ¡De hecho, no es trinh infección! Es solo trinh bacteria vaginal normal para muchas mujeres  Sin embargo, la bacteria GBS puede ser peligrosas para un bebé recién nacido si el bebé está expuesto a lolis bacteria particular remigio el parto y el nacimiento Y desarrolla trinh infección de la bacteria  La probabilidad de trinh infección de GBS en recién nacidos para trinh mandy que tiene las bacteria GBS en la vagina es cerca de 1% - 2%  Pat si la infeccion produce, un bebé podría ser gravemente enfermo  Por esta razón, hacemos un cultivo vaginal (Q-Tip de la vagina y el recto) para todas las mujeres embarazadas en el aproximadamente 39 semanas de [place]  Si el cultivo demuestra que hay bacteria GBS presente, no es trinh razón para el pánico! [employer] en esta situación dará valencia antibióticos de la mandy a través de pineda IV mientras está en parto  Cuando trinh madre se trata con antibióticos remigio el parto, pineda bebé DAVID NUNCA se infecta con la bacteria GBS

## 2019-07-30 NOTE — PROGRESS NOTES
Sebastian Rodney presents today for routine OB visit at 37w1d  /60   Ht 5' 3" (1 6 m)   Wt 63 4 kg (139 lb 12 8 oz)   BMI 24 76 kg/m²   She reports no complaints  Denies uterine contractions  Denies vaginal bleeding or leaking of fluid  Reports adequate fetal movement of at least 10 movements in 2 hours once daily  Vaginal cultures: all negative  Reviewed labor precautions and fetal kick counts as well as pre-eclampsia warning signs  Advised to continue prenatal vitamins and return in 1 week        G1 Problems (from 02/18/19 to present)     Problem Noted Resolved    Genetic screening 5/16/2019 by ALEJANDRO Norman No    Overview Signed 5/16/2019  1:14 PM by Rashmi Schmidt     Presented too late for screening         Fetal ultrasound 2/20/2019 by ALEJANDRO Norman No    Overview Addendum 5/16/2019  1:13 PM by ALEJANDRO Norman     2/19/19 (14w1d) EDC confirmed, NT WNL  4/10/19 (21w2d) no previa, lorena WNL w/missed views  5/2/19/ (24w3d) lorena WNL & complete

## 2019-08-06 ENCOUNTER — ROUTINE PRENATAL (OUTPATIENT)
Dept: OBGYN CLINIC | Facility: CLINIC | Age: 19
End: 2019-08-06

## 2019-08-06 VITALS
WEIGHT: 139.2 LBS | HEIGHT: 63 IN | SYSTOLIC BLOOD PRESSURE: 110 MMHG | BODY MASS INDEX: 24.66 KG/M2 | DIASTOLIC BLOOD PRESSURE: 60 MMHG

## 2019-08-06 DIAGNOSIS — Z3A.38 38 WEEKS GESTATION OF PREGNANCY: Primary | ICD-10-CM

## 2019-08-06 PROCEDURE — 99213 OFFICE O/P EST LOW 20 MIN: CPT | Performed by: OBSTETRICS & GYNECOLOGY

## 2019-08-06 NOTE — PROGRESS NOTES
Eula Kenyon presents today for routine OB visit at 38w1d  /60   Ht 5' 3" (1 6 m)   Wt 63 1 kg (139 lb 3 2 oz)   LMP 11/20/2018   BMI 24 66 kg/m²   She reports no complaints  Denies uterine contractions  Denies vaginal bleeding or leaking of fluid  Reports adequate fetal movement of at least 10 movements in 2 hours once daily  Vaginal cultures: negative  Reviewed labor precautions and fetal kick counts as well as pre-eclampsia warning signs  Advised to continue prenatal vitamins and return in 1 week        G1 Problems (from 02/18/19 to present)     Problem Noted Resolved    Genetic screening 5/16/2019 by ALEJANDRO Jain No    Overview Signed 5/16/2019  1:14 PM by Jered Morrell     Presented too late for screening         Fetal ultrasound 2/20/2019 by ALEJANDRO Jain No    Overview Addendum 5/16/2019  1:13 PM by ALEJANDRO Jain     2/19/19 (14w1d) EDC confirmed, NT WNL  4/10/19 (21w2d) no previa, lorena WNL w/missed views  5/2/19/ (24w3d) lorena WNL & complete

## 2019-08-13 ENCOUNTER — ROUTINE PRENATAL (OUTPATIENT)
Dept: OBGYN CLINIC | Facility: CLINIC | Age: 19
End: 2019-08-13

## 2019-08-13 VITALS
SYSTOLIC BLOOD PRESSURE: 110 MMHG | DIASTOLIC BLOOD PRESSURE: 60 MMHG | HEIGHT: 63 IN | BODY MASS INDEX: 25.05 KG/M2 | WEIGHT: 141.4 LBS

## 2019-08-13 DIAGNOSIS — Z3A.39 39 WEEKS GESTATION OF PREGNANCY: Primary | ICD-10-CM

## 2019-08-13 PROCEDURE — 99215 OFFICE O/P EST HI 40 MIN: CPT | Performed by: NURSE PRACTITIONER

## 2019-08-13 NOTE — PATIENT INSTRUCTIONS
JONES DE PARTO     * Necesito llamar inmediatamente yo si tengo incluso trinh pequeña cantidad de LIQUIDO se escapa de mi vagina, con o sin contracciones  * Dilcia llamar si tengo SANGRADO trinh cantidad igual o más que un período  Sueellen Boat[place] cantidad de flujo vaginal sangriento es normal al final del embarazo  * Dilcia llamar si tengo CONTRACCIONES cada calixto minutos remigio al [employer]  Necesito un reloj para tiempo  Yo dilcia tiempo desde el comienzo de trinh contracción hasta el comienzo de la siguiente  * De ser necesario ANTES DE  ir al hospital    * Necesito tener un plan para TRANSPORTE a ir al hospital cuando estoy en [facility]  Trabajo generalmente no es trinh emergencia que requiere trinh ambulancia  CUENTAS DEL RETROCESO FETAL    En el tercer trimestre (después de 28 semanas de gestación) deben realizar patada fetal cuentas cada día  Pineda bebé debe moverse al menos 10 veces en 2 horas remigio un tieo [place], trinh vez al día  Elegir el atime del día cuando el bebé es [place]  Trate de hacerlo a la misma hora cada día  Entrar en trinh posición cómoda y luego escribir el tiempo que tu bebé se mueve matheus  Cuenta cada movimiento hasta que el bebé se mueve 10 veces  Estos movimientos incluyen patadas, puñetazos, codazos, revolotea o rollos  [place] puede tardar entre 5 minutos a 2 horas  Anote el tiempo que sientes movimiento 10 del bebé  Si ha pasado 2 horas y tu bebé no se ha movido al menos 10 veces, usted debe llamar a la oficina de inmediato  MASAJE EN LA LOR PERINEAL O VAGINAL    Que puedo hacer ahora para reducir las probabilidades de desgarro remigio el parto? Masaje alrededor del orificio de la vagina realizado por usted misma (o por pineda ana)  El masaje en esta lor, ya sea antes del parto o remigio la segunda etapa del parto, [place] reducir la probabilidad de desgarro perineal remigio el parto    Asimismo, el uso de compresas tibias en el perineo remigio la etapa expulsiva del parto puede reducir la pravedad del desparro  [place] sucedera remigio la etapa expulsiva del parto  En casa tambien puede reducir las probabilidades de sufrir lesiones durnate el parto de con masajes en la shanell perineal     Maribel [place]? Todas las mujeres embarazadas a partir de, [place], las 34 semanas de Kaleb Midget  Cuando? Usted o pineda ana deben hacer masajes en la shanell vaginal remigio 5 a 10 minutos de 1 a 4 veces por semana  [place]? Use trinh mezcla de agua y aceite de [place], giacomo u tsai con 1 o 2 dedos (rivera la comodidad)  Inserte los dedos en la vagina entre 3 y 5cm  Aplique presion general hacia abajo y [employer] remigio 5 a [address] 3 y las 9 horas, de 1 a 4 veces por semana

## 2019-08-13 NOTE — PROGRESS NOTES
Jay Rodriguez presents today for routine OB visit at 39w1d  /60   Ht 5' 3" (1 6 m)   Wt 64 1 kg (141 lb 6 4 oz)   BMI 25 05 kg/m²   She reports pelvic pressure  Denies uterine contractions  Denies vaginal bleeding or leaking of fluid  Reports adequate fetal movement of at least 10 movements in 2 hours once daily  Vaginal cultures: all negative  Reviewed labor precautions and fetal kick counts as well as pre-eclampsia warning signs  Advised to continue prenatal vitamins and return in 1 week        G1 Problems (from 02/18/19 to present)     Problem Noted Resolved    Genetic screening 5/16/2019 by ALEJANDRO Reardon No    Overview Signed 5/16/2019  1:14 PM by Alfonzo Walters     Presented too late for screening         Fetal ultrasound 2/20/2019 by ALEJANDRO Reardon No    Overview Addendum 5/16/2019  1:13 PM by ALEJANDRO Reardon     2/19/19 (14w1d) EDC confirmed, NT WNL  4/10/19 (21w2d) no previa, lorena WNL w/missed views  5/2/19/ (24w3d) lorena WNL & complete

## 2019-08-16 ENCOUNTER — ANESTHESIA (INPATIENT)
Dept: ANESTHESIOLOGY | Facility: HOSPITAL | Age: 19
DRG: 542 | End: 2019-08-16
Payer: COMMERCIAL

## 2019-08-16 ENCOUNTER — ANESTHESIA EVENT (INPATIENT)
Dept: ANESTHESIOLOGY | Facility: HOSPITAL | Age: 19
DRG: 542 | End: 2019-08-16
Payer: COMMERCIAL

## 2019-08-16 ENCOUNTER — HOSPITAL ENCOUNTER (INPATIENT)
Facility: HOSPITAL | Age: 19
LOS: 2 days | Discharge: HOME | DRG: 542 | End: 2019-08-18
Attending: OBSTETRICS & GYNECOLOGY | Admitting: OBSTETRICS & GYNECOLOGY
Payer: COMMERCIAL

## 2019-08-16 DIAGNOSIS — Z3A.39 39 WEEKS GESTATION OF PREGNANCY: ICD-10-CM

## 2019-08-16 PROBLEM — O42.90 RUPTURE OF MEMBRANES WITH DELAY OF DELIVERY: Status: ACTIVE | Noted: 2019-08-16

## 2019-08-16 LAB
ABO GROUP BLD: NORMAL
BASE EXCESS BLDCOA CALC-SCNC: -4.3 MMOL/L (ref 3–11)
BASE EXCESS BLDCOV CALC-SCNC: -4.4 MMOL/L (ref 1–9)
BLD GP AB SCN SERPL QL: NEGATIVE
ERYTHROCYTE [DISTWIDTH] IN BLOOD BY AUTOMATED COUNT: 12.4 % (ref 11.6–15.1)
ERYTHROCYTE [DISTWIDTH] IN BLOOD BY AUTOMATED COUNT: 12.4 % (ref 11.6–15.1)
HCO3 BLDCOA-SCNC: 22.7 MMOL/L (ref 17.3–27.3)
HCO3 BLDCOV-SCNC: 21.2 MMOL/L (ref 12.2–28.6)
HCT VFR BLD AUTO: 29.8 % (ref 34.8–46.1)
HCT VFR BLD AUTO: 37 % (ref 34.8–46.1)
HGB BLD-MCNC: 11.8 G/DL (ref 11.5–15.4)
HGB BLD-MCNC: 9.7 G/DL (ref 11.5–15.4)
MCH RBC QN AUTO: 31.6 PG (ref 26.8–34.3)
MCH RBC QN AUTO: 31.8 PG (ref 26.8–34.3)
MCHC RBC AUTO-ENTMCNC: 31.9 G/DL (ref 31.4–37.4)
MCHC RBC AUTO-ENTMCNC: 32.6 G/DL (ref 31.4–37.4)
MCV RBC AUTO: 98 FL (ref 82–98)
MCV RBC AUTO: 99 FL (ref 82–98)
O2 CT VFR BLDCOA CALC: 7 ML/DL
OXYHGB MFR BLDCOA: 38.5 %
OXYHGB MFR BLDCOV: 67.6 %
PCO2 BLDCOA: 49.4 MM[HG] (ref 30–60)
PCO2 BLDCOV: 41 MM HG (ref 27–43)
PH BLDCOA: 7.28 [PH] (ref 7.23–7.43)
PH BLDCOV: 7.33 [PH] (ref 7.19–7.49)
PLATELET # BLD AUTO: 190 THOUSANDS/UL (ref 149–390)
PLATELET # BLD AUTO: 206 THOUSANDS/UL (ref 149–390)
PMV BLD AUTO: 11 FL (ref 8.9–12.7)
PMV BLD AUTO: 11.3 FL (ref 8.9–12.7)
PO2 BLDCOA: 19 MM HG (ref 5–25)
PO2 BLDCOV: 28.6 MM HG (ref 15–45)
RBC # BLD AUTO: 3.05 MILLION/UL (ref 3.81–5.12)
RBC # BLD AUTO: 3.73 MILLION/UL (ref 3.81–5.12)
RH BLD: POSITIVE
SAO2 % BLDCOV: 12.4 ML/DL
SPECIMEN EXPIRATION DATE: NORMAL
WBC # BLD AUTO: 10.55 THOUSAND/UL (ref 4.31–10.16)
WBC # BLD AUTO: 19.46 THOUSAND/UL (ref 4.31–10.16)

## 2019-08-16 PROCEDURE — NC001 PR NO CHARGE: Performed by: OBSTETRICS & GYNECOLOGY

## 2019-08-16 PROCEDURE — 99212 OFFICE O/P EST SF 10 MIN: CPT

## 2019-08-16 PROCEDURE — 85027 COMPLETE CBC AUTOMATED: CPT | Performed by: STUDENT IN AN ORGANIZED HEALTH CARE EDUCATION/TRAINING PROGRAM

## 2019-08-16 PROCEDURE — 59410 OBSTETRICAL CARE: CPT | Performed by: OBSTETRICS & GYNECOLOGY

## 2019-08-16 PROCEDURE — 82805 BLOOD GASES W/O2 SATURATION: CPT | Performed by: OBSTETRICS & GYNECOLOGY

## 2019-08-16 PROCEDURE — 86901 BLOOD TYPING SEROLOGIC RH(D): CPT | Performed by: OBSTETRICS & GYNECOLOGY

## 2019-08-16 PROCEDURE — 4A1HXCZ MONITORING OF PRODUCTS OF CONCEPTION, CARDIAC RATE, EXTERNAL APPROACH: ICD-10-PCS | Performed by: OBSTETRICS & GYNECOLOGY

## 2019-08-16 PROCEDURE — 2Y44X5Z PACKING OF FEMALE GENITAL TRACT USING PACKING MATERIAL: ICD-10-PCS | Performed by: OBSTETRICS & GYNECOLOGY

## 2019-08-16 PROCEDURE — 86850 RBC ANTIBODY SCREEN: CPT | Performed by: OBSTETRICS & GYNECOLOGY

## 2019-08-16 PROCEDURE — 86592 SYPHILIS TEST NON-TREP QUAL: CPT | Performed by: STUDENT IN AN ORGANIZED HEALTH CARE EDUCATION/TRAINING PROGRAM

## 2019-08-16 PROCEDURE — 0DQR0ZZ REPAIR ANAL SPHINCTER, OPEN APPROACH: ICD-10-PCS | Performed by: OBSTETRICS & GYNECOLOGY

## 2019-08-16 PROCEDURE — 0UQGXZZ REPAIR VAGINA, EXTERNAL APPROACH: ICD-10-PCS | Performed by: OBSTETRICS & GYNECOLOGY

## 2019-08-16 PROCEDURE — 86900 BLOOD TYPING SEROLOGIC ABO: CPT | Performed by: OBSTETRICS & GYNECOLOGY

## 2019-08-16 RX ORDER — LIDOCAINE HYDROCHLORIDE AND EPINEPHRINE 15; 5 MG/ML; UG/ML
INJECTION, SOLUTION EPIDURAL AS NEEDED
Status: DISCONTINUED | OUTPATIENT
Start: 2019-08-16 | End: 2019-08-16 | Stop reason: SURG

## 2019-08-16 RX ORDER — CEFOXITIN SODIUM 1 G/50ML
1000 INJECTION, SOLUTION INTRAVENOUS ONCE
Status: COMPLETED | OUTPATIENT
Start: 2019-08-16 | End: 2019-08-16

## 2019-08-16 RX ORDER — BISACODYL 10 MG
10 SUPPOSITORY, RECTAL RECTAL DAILY PRN
Status: DISCONTINUED | OUTPATIENT
Start: 2019-08-16 | End: 2019-08-18 | Stop reason: HOSPADM

## 2019-08-16 RX ORDER — ONDANSETRON 2 MG/ML
4 INJECTION INTRAMUSCULAR; INTRAVENOUS EVERY 8 HOURS PRN
Status: DISCONTINUED | OUTPATIENT
Start: 2019-08-16 | End: 2019-08-18 | Stop reason: HOSPADM

## 2019-08-16 RX ORDER — OXYTOCIN/RINGER'S LACTATE 30/500 ML
1-30 PLASTIC BAG, INJECTION (ML) INTRAVENOUS
Status: DISCONTINUED | OUTPATIENT
Start: 2019-08-16 | End: 2019-08-16

## 2019-08-16 RX ORDER — DIPHENHYDRAMINE HCL 25 MG
25 TABLET ORAL EVERY 6 HOURS PRN
Status: DISCONTINUED | OUTPATIENT
Start: 2019-08-16 | End: 2019-08-18 | Stop reason: HOSPADM

## 2019-08-16 RX ORDER — ROPIVACAINE HYDROCHLORIDE 2 MG/ML
INJECTION, SOLUTION EPIDURAL; INFILTRATION; PERINEURAL AS NEEDED
Status: DISCONTINUED | OUTPATIENT
Start: 2019-08-16 | End: 2019-08-16 | Stop reason: SURG

## 2019-08-16 RX ORDER — OXYCODONE HYDROCHLORIDE 10 MG/1
10 TABLET ORAL EVERY 4 HOURS PRN
Status: DISCONTINUED | OUTPATIENT
Start: 2019-08-16 | End: 2019-08-18 | Stop reason: HOSPADM

## 2019-08-16 RX ORDER — IBUPROFEN 600 MG/1
600 TABLET ORAL EVERY 6 HOURS PRN
Status: DISCONTINUED | OUTPATIENT
Start: 2019-08-16 | End: 2019-08-18 | Stop reason: HOSPADM

## 2019-08-16 RX ORDER — ACETAMINOPHEN 325 MG/1
650 TABLET ORAL EVERY 4 HOURS PRN
Status: DISCONTINUED | OUTPATIENT
Start: 2019-08-16 | End: 2019-08-18 | Stop reason: HOSPADM

## 2019-08-16 RX ORDER — DIAPER,BRIEF,INFANT-TODD,DISP
1 EACH MISCELLANEOUS AS NEEDED
Status: DISCONTINUED | OUTPATIENT
Start: 2019-08-16 | End: 2019-08-18 | Stop reason: HOSPADM

## 2019-08-16 RX ORDER — DOCUSATE SODIUM 100 MG/1
100 CAPSULE, LIQUID FILLED ORAL 2 TIMES DAILY
Status: DISCONTINUED | OUTPATIENT
Start: 2019-08-16 | End: 2019-08-18 | Stop reason: HOSPADM

## 2019-08-16 RX ORDER — OXYTOCIN/RINGER'S LACTATE 30/500 ML
PLASTIC BAG, INJECTION (ML) INTRAVENOUS
Status: COMPLETED
Start: 2019-08-16 | End: 2019-08-16

## 2019-08-16 RX ORDER — OXYCODONE HYDROCHLORIDE 5 MG/1
5 TABLET ORAL EVERY 4 HOURS PRN
Status: DISCONTINUED | OUTPATIENT
Start: 2019-08-16 | End: 2019-08-18 | Stop reason: HOSPADM

## 2019-08-16 RX ORDER — CEFOXITIN SODIUM 1 G/50ML
INJECTION, SOLUTION INTRAVENOUS
Status: COMPLETED
Start: 2019-08-16 | End: 2019-08-16

## 2019-08-16 RX ORDER — ONDANSETRON 2 MG/ML
4 INJECTION INTRAMUSCULAR; INTRAVENOUS EVERY 6 HOURS PRN
Status: DISCONTINUED | OUTPATIENT
Start: 2019-08-16 | End: 2019-08-16

## 2019-08-16 RX ORDER — CALCIUM CARBONATE 200(500)MG
1000 TABLET,CHEWABLE ORAL DAILY PRN
Status: DISCONTINUED | OUTPATIENT
Start: 2019-08-16 | End: 2019-08-18 | Stop reason: HOSPADM

## 2019-08-16 RX ORDER — SIMETHICONE 80 MG
80 TABLET,CHEWABLE ORAL 4 TIMES DAILY PRN
Status: DISCONTINUED | OUTPATIENT
Start: 2019-08-16 | End: 2019-08-18 | Stop reason: HOSPADM

## 2019-08-16 RX ORDER — SODIUM CHLORIDE, SODIUM LACTATE, POTASSIUM CHLORIDE, CALCIUM CHLORIDE 600; 310; 30; 20 MG/100ML; MG/100ML; MG/100ML; MG/100ML
125 INJECTION, SOLUTION INTRAVENOUS CONTINUOUS
Status: DISCONTINUED | OUTPATIENT
Start: 2019-08-16 | End: 2019-08-16

## 2019-08-16 RX ORDER — ROPIVACAINE HYDROCHLORIDE 2 MG/ML
INJECTION, SOLUTION EPIDURAL; INFILTRATION; PERINEURAL CONTINUOUS PRN
Status: DISCONTINUED | OUTPATIENT
Start: 2019-08-16 | End: 2019-08-16 | Stop reason: SURG

## 2019-08-16 RX ADMIN — OXYCODONE HYDROCHLORIDE 10 MG: 10 TABLET ORAL at 19:48

## 2019-08-16 RX ADMIN — LIDOCAINE HYDROCHLORIDE AND EPINEPHRINE 3 ML: 15; 5 INJECTION, SOLUTION EPIDURAL at 07:50

## 2019-08-16 RX ADMIN — ROPIVACAINE HYDROCHLORIDE 5 ML: 2 INJECTION, SOLUTION EPIDURAL; INFILTRATION at 07:53

## 2019-08-16 RX ADMIN — WITCH HAZEL 1 PAD: 500 SOLUTION RECTAL; TOPICAL at 18:16

## 2019-08-16 RX ADMIN — CEFOXITIN SODIUM 1000 MG: 1 INJECTION, SOLUTION INTRAVENOUS at 14:05

## 2019-08-16 RX ADMIN — BENZOCAINE AND LEVOMENTHOL: 200; 5 SPRAY TOPICAL at 18:16

## 2019-08-16 RX ADMIN — ROPIVACAINE HYDROCHLORIDE 10 ML/HR: 2 INJECTION, SOLUTION EPIDURAL; INFILTRATION at 07:59

## 2019-08-16 RX ADMIN — DOCUSATE SODIUM 100 MG: 100 CAPSULE, LIQUID FILLED ORAL at 18:16

## 2019-08-16 RX ADMIN — PHENYLEPHRINE HYDROCHLORIDE 50 MCG: 10 INJECTION INTRAVENOUS at 08:53

## 2019-08-16 RX ADMIN — PHENYLEPHRINE HYDROCHLORIDE 50 MCG: 10 INJECTION INTRAVENOUS at 08:49

## 2019-08-16 RX ADMIN — Medication 2 MILLI-UNITS/MIN: at 12:17

## 2019-08-16 RX ADMIN — SODIUM CHLORIDE, SODIUM LACTATE, POTASSIUM CHLORIDE, AND CALCIUM CHLORIDE 125 ML/HR: .6; .31; .03; .02 INJECTION, SOLUTION INTRAVENOUS at 05:49

## 2019-08-16 RX ADMIN — ACETAMINOPHEN 650 MG: 325 TABLET ORAL at 15:21

## 2019-08-16 RX ADMIN — HYDROCORTISONE 1 APPLICATION: 1 CREAM TOPICAL at 18:16

## 2019-08-16 RX ADMIN — ROPIVACAINE HYDROCHLORIDE 5 ML: 2 INJECTION, SOLUTION EPIDURAL; INFILTRATION at 07:55

## 2019-08-16 NOTE — PLAN OF CARE
Problem: Knowledge Deficit  Goal: Verbalizes understanding of labor plan  Description  Assess patient/family/caregiver's baseline knowledge level and ability to understand information  Provide education via patient/family/caregiver's preferred learning method at appropriate level of understanding  1  Provide teaching at level of understanding  2  Provide teaching via preferred learning method(s)  8/16/2019 0728 by Dana Trujillo RN  Outcome: Progressing  8/16/2019 0728 by Dana Trujillo RN  Outcome: Progressing     Problem: Labor & Delivery  Goal: Manages discomfort  Description  Assess and monitor for signs and symptoms of discomfort  Assess patient's pain level regularly and per hospital policy  Administer medications as ordered  Support use of nonpharmacological methods to help control pain such as distraction, imagery, relaxation, and application of heat and cold  Collaborate with interdisciplinary team and patient to determine appropriate pain management plan  1  Include patient in decisions related to comfort  2  Offer non-pharmacological pain management interventions  3  Report ineffective pain management to physician  8/16/2019 0728 by Dana Trujillo RN  Outcome: Progressing  8/16/2019 0728 by Dana Trujillo RN  Outcome: Progressing  Goal: Patient vital signs are stable  Description  1  Assess vital signs - vaginal delivery    8/16/2019 0728 by Dana Trujillo RN  Outcome: Progressing  8/16/2019 0728 by Dana Trujillo RN  Outcome: Progressing

## 2019-08-16 NOTE — PLAN OF CARE
Problem: Knowledge Deficit  Goal: Verbalizes understanding of labor plan  Description  Assess patient/family/caregiver's baseline knowledge level and ability to understand information  Provide education via patient/family/caregiver's preferred learning method at appropriate level of understanding  1  Provide teaching at level of understanding  2  Provide teaching via preferred learning method(s)  8/16/2019 1548 by Janet Butler RN  Outcome: Adequate for Discharge  8/16/2019 9806 by Janet Butler RN  Outcome: Progressing  8/16/2019 0728 by Janet Butler RN  Outcome: Progressing     Problem: Labor & Delivery  Goal: Manages discomfort  Description  Assess and monitor for signs and symptoms of discomfort  Assess patient's pain level regularly and per hospital policy  Administer medications as ordered  Support use of nonpharmacological methods to help control pain such as distraction, imagery, relaxation, and application of heat and cold  Collaborate with interdisciplinary team and patient to determine appropriate pain management plan  1  Include patient in decisions related to comfort  2  Offer non-pharmacological pain management interventions  3  Report ineffective pain management to physician  8/16/2019 1548 by Janet Butler RN  Outcome: Adequate for Discharge  8/16/2019 0728 by Janet Butler RN  Outcome: Progressing  8/16/2019 0728 by Janet Butler RN  Outcome: Progressing  Goal: Patient vital signs are stable  Description  1  Assess vital signs - vaginal delivery  8/16/2019 1548 by Janet Butler RN  Outcome: Adequate for Discharge  8/16/2019 0728 by Janet Butler RN  Outcome: Progressing  8/16/2019 0728 by Janet Butler RN  Outcome: Progressing     Problem: Labor & Delivery  Goal: Patient vital signs are stable  Description  1  Assess vital signs - vaginal delivery    8/16/2019 1548 by Janet Butler RN  Outcome: Adequate for Discharge  8/16/2019 2050 by Janet Butler RN  Outcome: Progressing  2019 0728 by Triny Carlson RN  Outcome: Progressing     Problem: POSTPARTUM  Goal: Experiences normal postpartum course  Description  INTERVENTIONS:  - Monitor maternal vital signs  - Assess uterine involution and lochia  Outcome: Progressing  Goal: Appropriate maternal -  bonding  Description  INTERVENTIONS:  - Identify family support  - Assess for appropriate maternal/infant bonding   -Encourage maternal/infant bonding opportunities  - Referral to  or  as needed  Outcome: Progressing  Goal: Establishment of infant feeding pattern  Description  INTERVENTIONS:  - Assess breast/bottle feeding  - Refer to lactation as needed  Outcome: Progressing  Goal: Incision(s), wounds(s) or drain site(s) healing without S/S of infection  Description  INTERVENTIONS  - Assess and document risk factors for skin impairment   - Assess and document dressing, incision, wound bed, drain sites and surrounding tissue  - Consider nutrition services referral as needed  - Oral mucous membranes remain intact  - Provide patient/ family education  Outcome: Progressing

## 2019-08-16 NOTE — DISCHARGE INSTRUCTIONS
Parto vaginal   CUIDADO AMBULATORIO:   Qué debe saber sobre el alumbramiento vaginal:  Se produce un alumbramiento vaginal cuando el bebé nace por la vagina (canal del parto)  Cómo prepararse para el parto vaginal:  Usted no podrá comer [address] en trabajo de parto activo  Pineda médico puede darle medicamentos para aliviar el dolor si usted decide tomarlos  Puede que necesite medicina para inducir (iniciar) el trabajo de parto si es que valencia no progresa  Es posible que tenga que moverse en la cama, ponerse de pie o caminar a fin de que el bebé se coloque en la posición adecuada para el nacimiento  Qué sucederá remigio el alumbramiento vaginal:   · Se puede colocar en varias posiciones remigio el parto  Puede acostarse boca arriba, apoyar los pies en los estribos de la mp o ponerse en cuclillas  Es posible que sienta presión en el recto  Esta presión es causada por el movimiento de la herman del bebé por [place]  Usted puede sentir la necesidad de pujar  Pineda médico le dirá cuándo pujar y guiará a pineda bebé fuera de las vías del [place]  Los CIGNA usar fórceps o succión para ayudar con el alumbramiento  Podrían practicar un tipo de incisión llamado episiotomía para agrandar la abertura de la vagina  [place] le dará más espacio a pineda bebé  · Después de que nazca pineda bebé, pineda médico colocará pinzas en el cordón umbilical que conecta al bebé con la placenta  Cortarán el cordón  Pineda útero seguirá contrayéndose después del parto para expulsar la placenta  El médico cerrará la incisión de la episiotomía o cualquier desgarro con suturas  Qué sucederá después del alumbramiento vaginal:   · [place] a pineda bebé  Usted podrá ryan a pineda bebé en brazos poco después de que nazca  Después de que los médicos comprueben que usted y pineda bebé están desiree, la [place] a otra joe  · Un médico le masajeará el útero varias veces para que cobre firmeza    Es posible que esta sensación no sea agradable  Podría sentir dolor en el abdomen remigio un silverio de 3 días después de jenny a mahsa porque el útero todavía se está contrayendo  Con estas contracciones, el útero expulsa selena de pineda interior y [place] pineda tamaño normal  Estas contracciones pueden doler más mientras usted amamante a pineda bebé  · Pineda médico podría sugerirle que se levante de la cama y que se siente en trinh silla o que camine  La actividad puede evitar la formación de coágulos sanguíneos  · Usted puede volver a pineda casa de 24 a 50 horas después del [place]  Si necesita ayuda en casa, consulte con pineda [facility] [address] en el [facility]  [place] profesional puede mostrarle cómo amamantar, jenny el biberón, atender a pineda bebé y cuidar de pineda perineo  Programe trinh consulta de seguimiento con pineda médico:  La mayoría de las mujeres regresan a las 6 semanas después de un alumbramiento vaginal  Si corresponde, consulte con pineda médico cómo cuidar de rolando heridas o puntos de sutura  Anote rolando preguntas para que se acuerde de hacerlas remigio rolando visitas  Busque atención médica de inmediato si:   · Pineda pierna se siente cálida, sensible y [place]  Se podría janis inflamado y blackmon  · Usted tiene fiebre  · Usted está orinando muy poco o nada en absoluto  · Usted presenta sangrado vaginal que empapa 1 toalla higiénica o más en 1 hora  · Usted se siente mareado, débil o tiene sensación de [place]  Pregúntele a pineda [place] vitaminas y minerales son adecuados para usted  · El dolor abdominal o en el perineo no mejora, o empeora  · Usted se siente deprimida  · Usted tiene preguntas o inquietudes acerca de pineda condición o cuidado  Medicamentos:  · AINEs (Analgésicos antiinflamatorios no esteroides) bari el ibuprofeno, ayudan a disminuir la inflamación, el dolor y la fiebre  Dinorah medicamento esta disponible con o sin trinh receta médica  Los AINEs pueden causar sangrado estomacal o problemas renales en ciertas personas   Si usted fatmata un medicamento anticoagulante, siempre pregúntele a pineda médico si los GUILLERMO son seguros para usted  Siempre jimbo la etiqueta de valencia medicamento y [place] instrucciones  · Los laxantes  le facilitan las evacuaciones intestinales  Es posible que deba ryan valencia medicamento para tratar o para prevenir el estreñimiento  · [place] rolando medicamentos bari se le haya indicado  Consulte con pineda médico si usted radha que pineda medicamento no le está ayudando o si presenta efectos secundarios  Infórmele si es alérgico a cualquier medicamento  Mantenga trinh lista actualizada de los [place], las vitaminas y los productos herbales que fatmata  Incluya los siguientes datos de los medicamentos: cantidad, frecuencia y motivo de administración  Traiga con usted la lista o los envases de la píldoras a rolando citas de seguimiento  Lleve la lista de los medicamentos con usted en jay jay de trinh emergencia  Actividad:  Descanse el mayor tiempo posible  No realice [employer] tiempo a la vez  Es posible que pueda hacer algo de ejercicio poco después de que nazca el bebé  Consulte con pineda médico antes de comenzar a ejercitarse  Si trabaja fuera de casa, pregunte cuándo puede reintegrarse a pineda trabajo  Ejercicios de Kegel:  Los ejercicios de Kegel pueden ayudar a que los músculos de pineda vagina y recto se recuperen más rápidamente  [facility] ejercicios de Kegel contrayendo y relajando los músculos alrededor de la vagina  Los ejercicios de Kegel ayudan a [employer]  Cuidado de los senos:  Es posible que los senos se sientan llenos y duros cuando le baje la [place]  Pregunte cómo cuidar de rolando senos, incluso si decide no amamantar al bebé  Estreñimiento:  Es posible que esté estreñida por un tiempo después de que nazca el bebé  No dinesh fuerza si las heces son Praveen Guardian duras  Consuma alimentos con un alto contenido de [place] y [place] mayor cantidad de líquido para evitar el estreñimiento   Ejemplos de alimentos ricos en fibra son Canda Moots, y [address]  El [place] de ciruelas y Anat Lapidus agua son muy buenos para ryan  Es probable que [employer] indiquen consumir fibra y ryan medicamentos de heces de venta sin Haylie Sages  [place] estos artículos según indicaciones médicas  Pregunte cómo puede prevenir o tratar las hemorroides  Cuidado del perineo:  El perineo es el área que se encuentra entre la vagina y el ano  Taryn Shank y [place]af[place]  [place] la ayudará a curar y a prevenir la infección  Lave el área suavemente con agua y jabón cuando se bañe o tome trinh [facility]  Enjuague el perineo con agua tibia después de orinar o de defecar  Tong médico podría sugerirle que use un baño de asiento con agua tibia para aliviar el dolor  Llene la ximena con 4 a 6 pulgadas de agua tibia  [address] usar un recipiente para baño de asiento que quepa en el inodoro  Siéntese en el baño de ximena por 20 minutos  Ted esto 2 a 3 veces a la semana según le indicaron  El agua cálida va a ayudara reducir el dolor y la inflamación  Secreción vaginal:  Tendrá secreción vaginal, llamada loquios, después de jenny a mahsa  El sangrado es blackmon o marrón oscuro con coágulos de1 a 3 días después del nacimieneto del bebé  La cantidad disminuirá y se tornará winsome pálido o marrón remigio 3 a 10 días  Se tornará walter o amarillo en el día 10 o 15  Por lo general, el loquios desaparece dentro de las 3 semanas  Use trinh compresa higiénica, en lugar de tampones, para evitar que contraiga trinh infección vaginal  Tendrá loquios hasta 3 semanas después del nacimiento de tong bebé  Períodos menstruales:  Es posible que vuelva a tener tong período menstrual dentro de 7 a 9 semanas de que haya nacido el bebé  Podría tardar Jason Vidales en volver a tener tong menstruación si está amamantando a tong bebé  Puede volver a quedar embarazada, incluso si no tiene períodos menstruales  Consulte con tong médico acerca de qué método anticonceptivo usar si no desea volver a Haim Sujata Arora en el [place] de ánimo:  Muchas nuevas madres experimentan algún cambio de humor después de jenny a mahsa  Algunos de [facility] se deben a la falta de sueño, los cambios hormonales y el cuidado del bebé  Algunos cambios de humor pueden ser serios, bari la depresión posparto  Hable con tong médico si no se siente capaz de cuidarse a usted misma y a tong bebé  Relaciones sexuales:  No tenga relaciones sexuales hasta que tong médico lo autorice  Es posible que no sienta deseo sexual o que sienta dolor con la actividad sexual  Puede usar un lubricante (gel) vaginal para que la actividad sexual sea más placentera  © 2017 [address] Information is for End User's use only and may not be sold, redistributed or otherwise used for commercial purposes  All illustrations and images included in CareNotes® are the copyrighted property of A D A M , Inc  or Blair Berman  Esta información es sólo para uso en educación  Tong intención no es darle un consejo médico sobre enfermedades o tratamientos  Colsulte con tong Ozell Fabry farmacéutico antes de seguir cualquier régimen médico para saber si es seguro y efectivo para usted  Sangrado posparto   LO QUE NECESITA SABER:   El sangrado posparto es un sangrado vaginal después de jenny a mahsa al bebé  Dinorah sangrado es normal tanto si tuvo un parto vaginal bari si se trata de trinh cesárea  Se compone de [place] y del tejido que recubrían el interior de tong útero cuando estaba Abram Hosseint  Loretta Brewster [facility]:   Qué esperar cuando hay sangrado posparto:  El sangrado posparto usualmente tiene [place]u duración de 10 monika y podría durar hasta 6 semanas  Tong sangrado puede variar de leve (jamshid no yahaira trinh toalla higiénica) a ser profuso (satura trinh toalla higiénica en 1 hora)  Usualmente, usted tiene un sangrado profuso dionisio después del parto, el cual disminuye en las próximas de semanas hasta que cesa completamente   El sangrado es blackmon o carpenter oscuro con coágulos por los primeros 1 a 3 días  Después se torna perera por varias semanas y luego se vuelve un flujo walter o amarillo hasta que cesa por completo  Programe trinh jessica con tong obstétrico bari se le indique:  No tenga relaciones sexuales hasta que tong médico lo autorice  Anote rolando preguntas para que se acuerde de hacerlas remigio rolando visitas  Comuníquese con tong médico o obstreta si:   · El sangrado aumenta o usted tiene trinh hemorragia que empapa trinh toalla higiénica en 1 hora por 2 horas seguidas  · Le salen grandes coágulos de [place]  · Usted está respirando más rápido que lo acostumbrado o tong corazón late más rápido que lo habitual     · Usted está orinando menos que de [place], o nada en lo absoluto  · Usted se siente mareado  · Usted tiene preguntas o inquietudes acerca de tong condición o cuidado  Regrese a la joe de emergencias si:   · A usted de repente le hace falta el aire y se siente desvanecer  · Tiene dolor repentino en el pecho  © 2017 2600 Logan Mariscal Information is for End User's use only and may not be sold, redistributed or otherwise used for commercial purposes  All illustrations and images included in CareNotes® are the copyrighted property of A D A M , Inc  or Blair Berman  Esta información es sólo para uso en educación  Tong intención no es darle un consejo médico sobre enfermedades o tratamientos  Colsulte con tong Melven Rimes farmacéutico antes de seguir cualquier régimen médico para saber si es seguro y efectivo para usted  Desgarro del perineo remigio el parto   LO QUE NECESITA SABER:   Un desgarro del perineo puede ocurrir remigio un parto vaginal  El perineo es el área que incluye la vagina y el ano  Un desgarro de primer juan es un desgarro que solamente afecta la piel del perineo  Un desgarro de [employer] músculos del perineo  Un desgarro de tercer juan se extiende [employer] esfínter anal (el músculo que rodea tong ano)   Un desgarro de cuarto juan involucra el esfínter anal y el tejido por debajo del mismo  INSTRUCCIONES SOBRE [facility]:   Pregúntele a pineda Ceferino Gambler vitaminas y minerales son adecuados para usted  · Usted tiene trinh fiebre de 100 4°F (38°C) o más  · Usted tiene [employer]  · Usted tiene más secreción que ha [place] de color o tiene mal olor  · Usted tiene más inflamación  · Usted tiene preguntas o inquietudes acerca de pineda condición o cuidado  Medicamentos:  Es posible que usted necesite alguno de los siguientes:  · Los analgésicos  podrían ser administrados  Pregunte cómo ryan estos medicamentos de trinh forma ocampo  · Laxantes o ablandadores fecales  podrían ser administrados  Estos le facilitan las evacuaciones intestinales  [place] puede ayudar a disminuir el dolor cuando usted defeca  · [place] rolando medicamentos bari se le haya indicado  Consulte con pineda médico si usted radha que pineda medicamento no le está ayudando o si presenta efectos secundarios  Infórmele si es alérgico a cualquier medicamento  Mantenga trinh lista actualizada de los [employer], las vitaminas y los productos herbales que fatmata  Incluya los siguientes datos de los medicamentos: cantidad, frecuencia y motivo de administración  Traiga con usted la lista o los envases de la píldoras a rolando citas de seguimiento  Lleve la lista de los medicamentos con usted en jay jay de trinh emergencia  Cuidados personales:   · Aplique hielo  en el perineo de 15 a 20 minutos cada hora o bari le indiquen  Use un paquete de hielo o ponga hielo molido dentro de [employer]  Cúbrala con trinh toalla  El hielo ayuda a evitar daño al tejido y a disminuir la inflamación y el dolor  · Mantenga el perineo limpio y seco   Lave el perineo suavemente con agua y [place]  Enjuáguelo y séquelo con trinh toalla suave y limpia  · [place] pineda toalla sanitaria regularmente  Lávese las brad antes y después de [place] pineda toalla sanitaria       · Pregunte sobre ejercicios del piso pélvico   Pineda médico podría recomendarle que dinesh estos ejercicios dentro de 7 días del parto  Acuda a rolando consultas de control con pineda médico según le indicaron  Anote rolando preguntas para que se acuerde de hacerlas remigio rolando visitas  © 2017 26[address] Logan Mariscal Information is for End User's use only and may not be sold, redistributed or otherwise used for commercial purposes  All illustrations and images included in CareNotes® are the copyrighted property of A D A M , Inc  or Blair Berman  Esta información es sólo para uso en educación  Pineda intención no es darle un consejo médico sobre enfermedades o tratamientos  Colsulte con pineda Dorna Yonatan farmacéutico antes de seguir cualquier régimen médico para saber si es seguro y efectivo para usted

## 2019-08-16 NOTE — ANESTHESIA PROCEDURE NOTES
Epidural Block    Patient location during procedure: OB  Start time: 8/16/2019 7:50 AM  Reason for block: primary anesthetic  Staffing  Anesthesiologist: Tori Brito DO  Performed: anesthesiologist   Preanesthetic Checklist  Completed: patient identified, site marked, surgical consent, pre-op evaluation, timeout performed, IV checked, risks and benefits discussed and monitors and equipment checked  Epidural  Patient position: sitting  Prep: Betadine  Patient monitoring: heart rate, continuous pulse ox and frequent blood pressure checks  Approach: midline  Location: lumbar (1-5)  Injection technique: JAGJIT saline  Needle  Needle type: Tuohy   Needle gauge: 18 G  Catheter type: side hole  Catheter size: 20 G  Catheter at skin depth: 9 cm  Test dose: negativenegative aspiration for CSF, negative aspiration for heme and no paresthesia on injection  patient tolerated the procedure well with no immediate complications

## 2019-08-16 NOTE — LACTATION NOTE
This note was copied from a baby's chart  CONSULT - LACTATION  Baby Girl Shruthi Rivera) Tamra s days female MRN: [id number]    Lenard Miguel [place] NURSERY Room / Bed: L&D 325(N)/L&D 325(N) Encounter: [id number]    Maternal Information     MOTHER:  Donna Farley  Maternal Age: 20 y o    OB History: #: 1, Date: 19, Sex: Female, Weight: 3030 g (6 lb 10 9 oz), GA: 39w4d, Delivery: Vaginal, Spontaneous, Apgar1: 8, Apgar5: 9, Living: Living, Birth Comments: None   Previouse breast reduction surgery? No    Lactation history:   Has patient previously breast fed: No   How long had patient previously breast fed:     Previous breast feeding complications:       Past Surgical History:   Procedure Laterality Date    NO PAST SURGERIES         Birth information:  YOB: 2020   Time of birth: 1:35 PM   Sex: female   Delivery type: Vaginal, Spontaneous   Birth Weight: 3030 g (6 lb 10 9 oz)   Percent of Weight Change: 0%     Gestational Age: 43w3d   [unfilled]    Assessment     Breast and nipple assessment: baby sleeping, not assessed at this time     Assessment: baby sleeping, not assessed at this time    Feeding assessment: feeding well  LATCH:  Latch: Repeated attempts, hold nipple in mouth, stimulate to suck   Audible Swallowing: Spontaneous and intermittent (24 hours old)   Type of Nipple: Flat   Comfort (Breast/Nipple): Soft/non-tender   Hold (Positioning): Partial assist, teach one side, mother does other, staff holds   [facility] Score: 7          Feeding recommendations:  breast feed on demand     Met with mother  Provided mother with Ready, Set, Baby booklet in [facility] [place]  Translated by staff RN Lei Jenkins  Discussed Skin to Skin contact an benefits to mom and baby  Talked about the delay of the first bath until baby has adjusted  Spoke about the benefits of rooming in  Feeding on cue and what that means for recognizing infant's hunger   Avoidance of pacifiers for the first month discussed  Talked about exclusive breastfeeding for the first 6 months  Positioning and latch reviewed as well as showing images of other feeding positions  Discussed the properties of a good latch in any position  Reviewed hand/manual expression  Discussed s/s that baby is getting enough milk and some s/s that breastfeeding dyad may need further help  Gave information on common concerns, what to expect the first few weeks after delivery, preparing for other caregivers, and how partners can help  Resources for support also provided  Discussed risks for early supplementation: over feeding, longer digestion times, engorgement for mom, lower milk supply for mom, and nipple confusion  Benefits of breast feeding for infant's intestinal tract, less engorgement for mom, protection from multiple disease processes as infant develops, avoidance of over feeding for infant, less nipple confusion, and increased health benefits for mom  Information on hand expression given  Discussed benefits of knowing how to manually express breast including stimulating milk supply, softening nipple for latch and evacuating breast in the event of engorgement  Encoraged MOB  to call for assistance, questions and concerns  Extension number for inpatient lactation support provided      Sylvia Zimmerman RN 8/16/2019 6:20 PM

## 2019-08-16 NOTE — OB LABOR/OXYTOCIN SAFETY PROGRESS
Labor Progress Note - Juan José Cam 20 y o  female MRN: [id number]    Unit/Bed#: L&D 325-01 Encounter: [id number]    OB History    Para Term  AB Living   1 0 0 0 0 0   SAB TAB Ectopic Multiple Live Births   0 0 0 0 0     Gestational Age: 43w3d     Contraction Frequency (minutes): irritability(pt moving)  Contraction Quality: Moderate  Tachysystole: No   Dilation: 5        Effacement (%): 90  Station: -1  Baseline Rate: 135 bpm  Fetal Heart Rate: 140 BPM             Notes/comments:     Patient had three shallow late decelerations starting at 0802 that resolved spontaneously  She was repositioned to the right side  She is making good cervical change, now /-  Will continue expectant management   She is comfortable with epidural     Maribel Schreiber MD 2019 8:15 AM

## 2019-08-16 NOTE — OB LABOR/OXYTOCIN SAFETY PROGRESS
Labor Progress Note - Radha Solis 20 y o  female MRN: [id number]    Unit/Bed#: L&D 325-01 Encounter: [id number]    OB History    Para Term  AB Living   1 0 0 0 0 0   SAB TAB Ectopic Multiple Live Births   0 0 0 0 0     Gestational Age: 43w3d     Contraction Frequency (minutes): 1 5-3  Contraction Quality: Moderate  Tachysystole: No   Dilation: 10  Dilation Complete Date: 19  Dilation Complete Time: 1023  Effacement (%): 100  Station: 2  Baseline Rate: 130 bpm  Fetal Heart Rate: 140 BPM             Notes/comments:     Patient fully dilated, no urge to push  CAT I tracing with early decelerations  Will labor down until attending is available for delivery (elena in operating room for )      Barbie Omer MD 2019 10:23 AM

## 2019-08-16 NOTE — DISCHARGE SUMMARY
Discharge Summary - Gillian Frank 20 y o  female MRN: [id number]    Unit/Bed#: L&D 325-01 Encounter: [id number]    Admission Date: 2019     Discharge Date: 2019    Admitting Diagnosis:   Patient Active Problem List   Diagnosis    Fetal ultrasound    Genetic screening    39 weeks gestation of pregnancy    Obstetric vaginal laceration with type 3b third degree perineal laceration     (spontaneous vaginal delivery)       Discharge Diagnosis:   Same, delivered    Procedures:   spontaneous vaginal delivery    Admitting Attending: Dr Regina Espinosa MD  Delivery Attending: Dr Regina Espinosa MD  Discharge Attending: Dr Pee Tan MD    Hospital Course:     Gillian Frank is a 20 y o  Estrellita Pleas who was admitted in labor  She had SROM at home, and came in with contractions and cervical change  She was started on pitocin while pushing to assist in second stage  She then underwent a spontaneous vaginal delivery complicated by 3B laceration and delivered a viable female  at 80  APGARS were 8, 9 at 1 and 5 minutes, respectively   weighed 6lb 10 9oz   was then transferred to  nursery  After repair of laceration, patient was noted to have continued vaginal oozing, and required vaginal packing, which was removed later that day  Patient tolerated the procedure well and was transferred to postpartum in stable condition  The patient's post partum course was unremarkable  On day of discharge, she was ambulating and able to reasonably perform all ADLs  She was voiding and had appropriate bowel function  Pain was well controlled  She was discharged home on postpartum day #2 without complications  Patient was instructed to follow up with her OB as an outpatient and was given appropriate warnings to call provider if she develops signs of infection or uncontrolled pain      Condition at discharge:   good     Disposition:   See After Visit Summary for discharge disposition information  Planned Readmission:   No    Discharge Medications:   Please see after visit summary for full list of discharge medications  Discharge instructions :   -Do not place anything (no partner, tampons or douche) in your vagina for 6 weeks  -You may walk for exercise for the first 6 weeks then gradually return to your usual activities    -Please do not drive for 1 week if you have no stitches and for 2 weeks if you have stitches or underwent a  delivery     -You may take baths or shower per your preference    -Please look at your bust (breasts) in the mirror daily and call provider for redness or tenderness or increased warmth  - If you have had a  please look at your incision daily as well and call provider for increasing redness or steady drainage from the incision    -Please call your provider if temperature > 100 4*F or 38* C, worsening pain or a foul discharge      Marco Antonio Alan, DO

## 2019-08-16 NOTE — L&D DELIVERY NOTE
Vaginal Delivery Summary - OB/GYN   Monica Fermin 20 y o  female MRN: [id number]  Unit/Bed#: L&D 325-01 Encounter: [id number]          Predelivery Diagnosis:  1  Pregnancy at 39w4d   2  Labor with SROM  3  GBS negative    Postdelivery Diagnosis:  1  Same as above  2  Delivery of term   3  3B laceration    Procedure: Spontaneous Vaginal Delivery, repair of 3B laceration    Attending: Annabel    Assistant: Demetra Joya    Anesthesia: Epidural    QBL: 886cc  Admission H 8  Admission platelets: 520    Complications: none apparent    Specimens: cord blood, arterial and venous cord blood gasses, placenta to storage    Findings:   1  Viable female at 1335, with APGARS of 8 and 9 at 1 and 5 minutes respectively,  2  Spontaneous delivery of intact placenta at 1340  3  3B degree laceration and sulcal laceration repaired with Vicryl  4  Blood gases:    Umbilical Cord Venous Blood Gas:  Results from last 7 days   Lab Units 19  1338   PH COV  7 331   PCO2 COV mm HG 41 0   HCO3 COV mmol/L 21 2   BASE EXC COV mmol/L -4 4*   O2 CT CD VB mL/dL 12 4   O2 HGB, VENOUS CORD % 72 2     Umbilical Cord Arterial Blood Gas:  Results from last 7 days   Lab Units 19  1338   PH COA  7 280   PCO2 COA  49 4   PO2 COA mm HG 19 0   HCO3 COA mmol/L 22 7   BASE EXC COA mmol/L -4 3*   O2 CONTENT CORD ART ml/dl 7 0   O2 HGB, ARTERIAL CORD % 38 5       Disposition:  Patient tolerated the procedure well and was recovering in labor and delivery room     Brief history and labor course:  Ms Monica Fermin is a 20 y o   at 38wk4d  She presented to labor and delivery in labor with SROM  She woke from sleep this morning with ROM  She did not need antibiotics, and progressed to fully dilated by 1023 on 19  Patient had a dense epidural and attending SOD was tied up in the OR, so we delayed pushing until 1102       Description of procedure    Warm compresses were applied during pushing and perineal massage was performed  She was started on pitocin to augment contraction strength and assist with second stage  After pushing for 70 minutes patient requested a 30 minute break to rest  Pushing was resumed at 1245 and at 1335 patient delivered a viable female , 6 pounds 11 ounces, apgars of 8 (1 min) and 9 (5 min)  The fetal vertex delivered spontaneously  Baby was checked for nuchal  None present  The anterior shoulder delivered atraumatically with maternal expulsive forces and the assistance of gentle downward traction  The posterior shoulder delivered with maternal expulsive forces and the assistance of gentle upward traction  The remainder of the fetus delivered spontaneously  Upon delivery, the infant was placed on the mothers abdomen and the cord was clamped and cut  Delayed cord clamping was performed  The infant was noted to cry spontaneously and had all signs of life  There was no evidence for injury  Awaiting nurse resuscitators evaluated the   Arterial and venous cord blood gases and cord blood was collected for analysis  These were promptly sent to the lab  In the immediate post-partum, 30 units of IV pitocin was administered, and the uterus was noted to contract down well with massage and pitocin  The placenta delivered spontaneously at 1340 and was noted to have a centrally inserted 3 vessel cord  The vagina, cervix, perineum, and rectum were inspected and there was noted to be a 3b laceration and a left sulcal laceration  Full rectovaginal exam performed, rectal mucosa noted to be intact  3B laceration repaired with 2-0 Vicryl in the standard PISA fashion  1g IV Cefoxitin given for prophylaxis during the repair  The left sulcal laceration was repaired with running locked 3-0 Vicryl sutures in the standard fashion  The second degree laceration was repaired with 3-0 Vicryl in the standard fashion   At conclusion of repair, rectal exam was repeated, rectal mucosa noted to still be intact, no evidence of suture material  The vagina was noted to be edematous and oozy, with hemostasis at sites of repair, therefore decision was made to place 12ft vaginal packing and observe  Torres catheter was replaced until packing is removed  Uterus was noted to be firm  At the conclusion of the procedure, all needle, sponge, and instrument counts were noted to be correct  Patient tolerated the procedure well and was allowed to recover in labor and delivery room with family and  before being transferred to the post-partum floor  Dr Ketan Davila was present and participated in all key portions of the case          Remberto Romero MD  2019  2:59 PM

## 2019-08-16 NOTE — H&P
H&P Exam - Obstetrics   Katey Elam 20 y o  female MRN: [id number]  Unit/Bed#: L&D 325-01 Encounter: [id number]      History of Present Illness     Chief Complaint: SROM    HPI:  Katey Elam is a 20 y o   female with an GILL of 2019, by Ultrasound at 39w4d weeks gestation who is being admitted for rupture of membranes  She states that it woke her up from her sleep and she had a large cluster of fluid admitted bed wet  She denies any vaginal bleeding  She has been having contractions that she has not timed  She states that she has had decreased fetal movement since the rupture of membranes  Contractions:  Yes  Loss of fluid:  Yes  Vaginal bleeding:  Denies  Fetal movement:  Decreased    She is [facility]OB patient  PREGNANCY COMPLICATIONS:   1) [place] pregnancy    OB History    Para Term  AB Living   1             SAB TAB Ectopic Multiple Live Births                  # Outcome Date GA Lbr Oscar/2nd Weight Sex Delivery Anes PTL Lv   1 Current                Baby complications/comments: [place] IUP    Review of Systems   Constitutional: Negative  HENT: Negative  Eyes: Negative  Respiratory: Negative  Cardiovascular: Negative  Gastrointestinal: Negative  Endocrine: Negative  Genitourinary: Positive for vaginal discharge  Musculoskeletal: Negative  Skin: Negative  Allergic/Immunologic: Negative  Neurological: Negative  Hematological: Negative  Psychiatric/Behavioral: Negative            Historical Information   Past Medical History:   Diagnosis Date    Medical history non-contributory      Past Surgical History:   Procedure Laterality Date    NO PAST SURGERIES       Social History   Social History     Substance and Sexual Activity   Alcohol Use No     Social History     Substance and Sexual Activity   Drug Use No     Social History     Tobacco Use   Smoking Status Never Smoker   Smokeless Tobacco Never Used     Family History: non-contributory    Meds/Allergies      Medications Prior to Admission   Medication    Prenatal Vit-Fe Fumarate-FA (PRENATAL VITAMIN) 27-0 8 MG TABS      No Known Allergies    OBJECTIVE:    Vitals: Blood pressure 114/79, pulse 81, temperature 98 7 °F (37 1 °C), temperature source Oral, resp  rate 18, height 5' 3" (1 6 m), weight 64 kg (141 lb), last menstrual period 11/20/2018, SpO2 100 %, not currently breastfeeding  Body mass index is 24 98 kg/m²  Physical Exam   Constitutional: She is oriented to person, place, and time  She appears well-developed and well-nourished  No distress  HENT:   Head: Normocephalic and atraumatic  Eyes: Pupils are equal, round, and reactive to light  Conjunctivae and EOM are normal    Neck: Normal range of motion  Neck supple  No JVD present  No tracheal deviation present  Cardiovascular: Normal rate, regular rhythm, normal heart sounds and intact distal pulses  Exam reveals no gallop and no friction rub  No murmur heard  Pulmonary/Chest: Effort normal and breath sounds normal  No stridor  No respiratory distress  She has no wheezes  She has no rales  Abdominal: Soft  She exhibits no distension and no mass  There is no tenderness  There is no rebound and no guarding  Genitourinary:   Genitourinary Comments: See cervical exam below   Musculoskeletal: Normal range of motion  She exhibits no edema, tenderness or deformity  Neurological: She is alert and oriented to person, place, and time  No cranial nerve deficit  Coordination normal    Skin: Skin is warm and dry  She is not diaphoretic  Psychiatric: She has a normal mood and affect           [place]:  Positive  Nitrazine:  Positive    Cervix:  Dilation: 3  Effacement (%): 80  Station: -2    Fetal heart rate:        [place]:        EFW: 7 5 lbs    GBS: Negative    Prenatal Labs:   Blood Type:   Lab Results   Component Value Date/Time    ABO Grouping A 05/16/2019 03:03 PM     , D (Rh type):   Lab Results   Component Value Date/Time    Rh Factor Positive 2019 03:03 PM     , Antibody Screen: No results found for: ANTIBODYSCR , HCT/HGB:   Lab Results   Component Value Date/Time    Hematocrit 34 0 (L) 2019 03:03 PM    Hemoglobin 11 2 (L) 2019 03:03 PM      , MCV:   Lab Results   Component Value Date/Time    MCV 99 2019 03:03 PM      , Platelets:   Lab Results   Component Value Date/Time    Platelets 187  03:03 PM      , 1 hour Glucola:   Lab Results   Component Value Date/Time    Glucose 96 2019 03:03 PM   , 3 hour GTT: No results found for: BFWAJRL1QU, Varicella: No results found for: VARICELLAIGG    , Rubella:   Lab Results   Component Value Date/Time    Rubella IgG Quant 142 2 2019 03:03 PM        , VDRL/RPR:   Lab Results   Component Value Date/Time    RPR Non-Reactive 2019 03:03 PM      , Urine Culture/Screen:   Lab Results   Component Value Date/Time    Urine Culture No Growth <1000 cfu/mL 2019 02:10 PM       , Urine Drug Screen: No results found for: AMPHETUR, BARBTUR, BDZUR, THCUR, COCAINEUR, METHADONEUR, OPIATEUR, PCPUR, MTHAMUR, ECSTASYUR, TRICYCLICSUR, Hep B:   Lab Results   Component Value Date/Time    Hepatitis B Surface Ag Non-reactive 2019 03:03 PM     , Hep C: No components found for: HEPCSAG, EXTHEPCSAG   , HIV:   Lab Results   Component Value Date/Time    HIV-1/HIV-2 Ab Non-Reactive 2019 03:03 PM     , Chlamydia: No results found for: EXTCHLAMYDIA  , Gonorrhea:   Lab Results   Component Value Date/Time    N gonorrhoeae, DNA Probe Negative 2019 09:10 AM     , Group B Strep:    Lab Results   Component Value Date/Time    Strep Grp B PCR Negative for Beta Hemolytic Strep Grp B by PCR 2019 09:10 AM          Invasive Devices     None                   Assessment/Plan     ASSESSMENT:  18yo  at 39w4d weeks gestation who is being admitted for SROM      PLAN:   1) Admit   2) CBC, RPR, Blood Type   3) Start with 125 cc/hour of LR   4) re-evaluate cervix in 2 hours for possible Pitocin titration   4) GBS negative status    5) Analgesia and/or epidural at patient request   6) Anticipate    7) Discussed with Dr Abhay Enriquez      This patient will be an INPATIENT  and I certify the anticipated length of stay is >2 Midnights      Alexia Wong MD  OBGYN PGY-2  2019  5:24 AM

## 2019-08-16 NOTE — PROGRESS NOTES
Patient complaining of fatigue after pushing for the past 1 hour  Patient was noted to be complete at 1023, but did not feel any urge to push with her epidural  We began pushing at 1102 as the patient began feeling the urge to push  Over the past 1 hour, she has progressed from +2 station to +3 station  Maternal efforts are ok and when she exerts a good pushing effort, the fetal vertex does descend  At this time, fetal vertex feels ROP with moderate amount of caput  Contractions are also not palpating as strong as previously  At this time, patient requests to take a break from pushing  She will take a 30 minute break after which we will resume pushing  We will also start pitocin to help increase strength of contractions  FHT has been category 2 throughout the second stage of labor, with period of recurrent variable decelerations with pushing, however, maintains moderate variability with 15x15 accelerations  Did speak with patient about the possibility of  section  She is very agreeable to this as she is "tired " She was extensively counseled on  section vs operative vaginal delivery  We will not perform an OVD with vacuum if patient does not feel she can put forth adequate pushing efforts  She was counseled on the risk of OVD including 3rd and 4th degree perineal laceration as well as fetal complications including subgaleal hemorrhage  There is also a risk of failure, which would necessitate  delivery if patient was unable to continue pushing  We also discussed the details of the procedure of  delivery and the possible complications that can occur during surgical delivery including but not limited to infection, hemorrhage, need for blood transfusion, damage to surrounding structures, risk of uterine extension as the fetal vertex is very low at +3 station  There is also risk to the patient regarding future pregnancies with a uterine scar from previous  section  Patient was counseled on this and her decision at this time is to attempt to continue pushing after her 30 minute break  Will reevaluate as needed

## 2019-08-16 NOTE — ANESTHESIA PREPROCEDURE EVALUATION
Review of Systems/Medical History  Patient summary reviewed  Chart reviewed  No history of anesthetic complications     Cardiovascular  Negative cardio ROS    Pulmonary  Negative pulmonary ROS        GI/Hepatic  Negative GI/hepatic ROS          Negative  ROS        Endo/Other  Negative endo/other ROS      GYN  Currently pregnant ,          Hematology  Negative hematology ROS      Musculoskeletal  Negative musculoskeletal ROS        Neurology  Negative neurology ROS      Psychology   Negative psychology ROS              Physical Exam    Airway    Mallampati score: II  TM Distance: >3 FB  Neck ROM: full     Dental       Cardiovascular  Comment: Negative ROS, Rhythm: regular, Rate: normal, Cardiovascular exam normal    Pulmonary  Pulmonary exam normal Breath sounds clear to auscultation,     Other Findings        Anesthesia Plan  ASA Score- 2     Anesthesia Type-     Plan Factors-    Induction-     Postoperative Plan-     Informed Consent-

## 2019-08-17 PROCEDURE — 99024 POSTOP FOLLOW-UP VISIT: CPT | Performed by: OBSTETRICS & GYNECOLOGY

## 2019-08-17 RX ADMIN — DOCUSATE SODIUM 100 MG: 100 CAPSULE, LIQUID FILLED ORAL at 08:49

## 2019-08-17 RX ADMIN — DOCUSATE SODIUM 100 MG: 100 CAPSULE, LIQUID FILLED ORAL at 18:27

## 2019-08-17 RX ADMIN — IBUPROFEN 600 MG: 600 TABLET ORAL at 08:49

## 2019-08-17 NOTE — PLAN OF CARE
Problem: POSTPARTUM  Goal: Experiences normal postpartum course  Description  INTERVENTIONS:  - Monitor maternal vital signs  - Assess uterine involution and lochia  Outcome: Progressing  Goal: Appropriate maternal -  bonding  Description  INTERVENTIONS:  - Identify family support  - Assess for appropriate maternal/infant bonding   -Encourage maternal/infant bonding opportunities  - Referral to  or  as needed  Outcome: Progressing  Goal: Establishment of infant feeding pattern  Description  INTERVENTIONS:  - Assess breast/bottle feeding  - Refer to lactation as needed  Outcome: Progressing  Goal: Incision(s), wounds(s) or drain site(s) healing without S/S of infection  Description  INTERVENTIONS  - Assess and document risk factors for skin impairment   - Assess and document dressing, incision, wound bed, drain sites and surrounding tissue  - Consider nutrition services referral as needed  - Oral mucous membranes remain intact  - Provide patient/ family education  Outcome: Progressing

## 2019-08-17 NOTE — LACTATION NOTE
This note was copied from a baby's chart  Mother verbalized breast/bottle feeding  is going well as she wants  No needs at this time  Enc to call for assistance as needed,phone # given

## 2019-08-17 NOTE — PROGRESS NOTES
Progress Note - OB/GYN   Nannette Edna 20 y o  female MRN: [id number]  Unit/Bed#: L&D 325-01 Encounter: [id number]    Assessment:  21 y/o  s/p  on 2016 with 3b laceration and Left sulcal laceration, EBL 886cc PPD 0 doing well    Plan: Torres and Vaginal packing removed without difficulty  Will continue to monitor vital signs  Will obtain CBC Stat  Continue Routine Postpartum management    Subjective/Objective     Subjective: Patient seen and examined in bed, was able to get OOB into chair  At which patient stated that she felt "dizzy" as this was the first time she had sat up after delivery  Pain well controlled with medication  When laying in bed, denies lightheadedness, dizziness, or chest palpitations  Objective:    Vitals: Blood pressure 112/59, pulse 100, temperature 97 8 °F (36 6 °C), temperature source Oral, resp  rate 18, height 5' 3" (1 6 m), weight 64 kg (141 lb), last menstrual period 2018, SpO2 (!) 79 %, unknown if currently breastfeeding  ,Body mass index is 24 98 kg/m²  Intake/Output Summary (Last 24 hours) at 2019  Last data filed at 2019 1615  Gross per 24 hour   Intake --   Output 3386 ml   Net -3386 ml       Invasive Devices     Peripheral Intravenous Line            Peripheral IV 19 Right Hand less than 1 day          Epidural Line            Epidural Catheter 19 less than 1 day          Drain            Urethral Catheter 16 Fr  less than 1 day                Physical Exam:   12 feet of vaginal packing removed from vaginal with minimal/moderate clotted lochia observed  No brisk bleeding observed  Uterine fundus firm and below umbilicus     Lab, Imaging and other studies: I have personally reviewed pertinent reports

## 2019-08-18 VITALS
HEART RATE: 79 BPM | RESPIRATION RATE: 14 BRPM | OXYGEN SATURATION: 79 % | SYSTOLIC BLOOD PRESSURE: 81 MMHG | HEIGHT: 63 IN | WEIGHT: 141 LBS | BODY MASS INDEX: 24.98 KG/M2 | DIASTOLIC BLOOD PRESSURE: 57 MMHG | TEMPERATURE: 98.4 F

## 2019-08-18 PROCEDURE — 99024 POSTOP FOLLOW-UP VISIT: CPT | Performed by: OBSTETRICS & GYNECOLOGY

## 2019-08-18 RX ORDER — IBUPROFEN 600 MG/1
600 TABLET ORAL EVERY 6 HOURS PRN
Qty: 30 TABLET | Refills: 0 | Status: SHIPPED | OUTPATIENT
Start: 2019-08-18

## 2019-08-18 RX ORDER — ACETAMINOPHEN 325 MG/1
650 TABLET ORAL EVERY 4 HOURS PRN
Qty: 30 TABLET | Refills: 0 | Status: SHIPPED | OUTPATIENT
Start: 2019-08-18

## 2019-08-18 RX ORDER — DOCUSATE SODIUM 100 MG/1
100 CAPSULE, LIQUID FILLED ORAL 2 TIMES DAILY
Qty: 10 CAPSULE | Refills: 0 | Status: SHIPPED | OUTPATIENT
Start: 2019-08-18

## 2019-08-18 RX ORDER — DIAPER,BRIEF,INFANT-TODD,DISP
1 EACH MISCELLANEOUS AS NEEDED
Qty: 30 G | Refills: 0 | Status: SHIPPED | OUTPATIENT
Start: 2019-08-18

## 2019-08-18 NOTE — DISCHARGE SUMMARY
Discharge Summary - Louis Jimenez 20 y o  female MRN: [id number]    Unit/Bed#: L&D 304-01 Encounter: [id number]    Admission Date: 2019     Discharge Date: 2019    Admitting Attending: Alexandra Joshi  Delivering Attending: Gisele Mantilla  Discharge Attending: Krista Fernandes    Diagnosis:   Labor, SROM    Procedures: Spontaneous Vaginal Delivery    Complications: 3b perineal laceration at time of delivery    Hospital Course: Patient was admitted for labor with SROM  She was managed expectantly for labor and received an epidural for pain control  She delivered via  and sustained both a 3b perineal and left sulcus lacerations  The repair was uncomplicated, however, patient was oozy secondary to edematous tissue and received vaginal packing for 5 hours postpartum  Once the packing was removed, she was spontaneously voiding and the remainder of her postpartum course was uncomplicated  She was discharged with Colace to be taken twice daily for 6 weeks  Condition at discharge: stable     On day of discharge, patient was tolerating PO, passing flatus, urinating, and ambulating  Her pain was well controlled with oral analgesics  She was discharged home on postpartum day #2 with standard post partum instructions to follow up with her physician in 3-6 weeks for a postpartum appointment  Discharge instructions/Information to patient and family:   - Do not place anything (no partner, tampons or douche) in your vagina for 6 weeks  -You may walk for exercise for the first 6 weeks then gradually return to your usual activities    -Please do not drive for 1 week if you have no stitches and for 2 weeks if you have stitches or underwent a  delivery     -You may take baths or shower per your preference    -Please look at your bust (breasts) in the mirror daily and call for redness or tenderness or increased warmth    -Please call us for temperature > 100 4*F or 38* C, worsening pain or a foul discharge  Discharge Medications:   Prenatal vitamin daily for 6 months or the duration of nursing whichever is longer  Motrin 600 mg orally every 6 hours as needed for pain  Tylenol (over the counter) per bottle directions as needed for pain: do NOT use with percocet  Hydrocortisone cream 1% (over the counter) applied 1-2x daily to hemorrhoids as needed    Provisions for Follow-Up Care: Follow up with your doctor in 3 weeks for postpartum care       Planned Readmission: No

## 2019-08-18 NOTE — PROGRESS NOTES
Progress Note - OB/GYN   Cory Civil 20 y o  female MRN: [id number]  Unit/Bed#: L&D 304-01 Encounter: [id number]    Assessment:  PP#2 s/p Spontaneous Vaginal Delivery and Repair of 3b degree perineal laceration, stable    Plan:  1  Routine postpartum care  -encourage ambulation and breastfeeding  2  Repair of 3b laceration and extensive sulcus tear  -patient is voiding, lochia wnl  -Hb stable  -continue colace BID for 6 weeks  3  Contraception: patient currently unsure, will discuss again prior to discharge  4  Discharge home today  -patient to follow up for routine postpartum visit    Subjective/Objective   Chief Complaint:     PP#2 s/p Spontaneous Vaginal Delivery and Repair of 3b degree perineal laceration    Subjective:     Patient resting comfortably this am, baby rooming in, no complaints    Pain: yes  Tolerating PO: yes  Voiding: yes  Flatus: yes  BM: no  Ambulating: yes  Breastfeeding: Breastfeeding  Chest pain: no  Shortness of breath: no  Leg pain: no  Lochia: minimal to moderate    Objective:     Vitals:  Vitals:    08/17/19 0724 08/17/19 1100 08/17/19 1600 08/17/19 2309   BP: 91/51 (!) 95/48 95/52 104/65   BP Location: Right arm Right arm Right arm Right arm   Pulse: 95 95 86 88   Resp: 16 18 20 16   Temp: 99 1 °F (37 3 °C) 97 9 °F (36 6 °C) 98 4 °F (36 9 °C) 98 7 °F (37 1 °C)   TempSrc: Oral Oral Oral Oral   SpO2:       Weight:       Height:           Physical Exam:     Physical Exam   GEN: NAD  Lungs: CTAB  CV:RRR  Abdomen: soft, non tender, non distended  Perineum: moderately swollen and tender, repair is intact  Ext: non tender  Uterine fundus firm and non-tender,  At umbilicus -1       Lab, Imaging and other studies: I have personally reviewed pertinent reports        Lab Results   Component Value Date    WBC 19 46 (H) 08/16/2019    HGB 9 7 (L) 08/16/2019    HCT 29 8 (L) 08/16/2019    MCV 98 08/16/2019     08/16/2019               Aminta Elias MD  08/18/19

## 2019-08-18 NOTE — LACTATION NOTE
This note was copied from a baby's chart  CONSULT - LACTATION  Baby Girl Emanuel Cedillo 2 days female MRN: [id number]    [address] NURSERY Room / Bed: L&D 304(N)/L&D 304(n) Encounter: [id number]      Breastfeeding discharge booklet given and reviewed  Emphasized 8 or more  feedings in a 24 hour period with each feeding being at least 10 minutes, what to expect for the number of diapers per day of life and the progression of properties of the  stooling pattern  Discussed s/s that breastfeeding is going well after day 4 and when to get help from a pediatrician or lactation support person after day 4  Booklet included Breast Pumping Instructions, When You Go Back to Work or School, and Breastfeeding Resources for after discharge including access to the number for the [employer]  Also reviewed bottle feeding while breastfeeding  Discussed risks for early supplementation: over feeding, longer digestion times, engorgement for mom, lower milk supply for mom, and nipple confusion  Benefits of breast feeding for infant's intestinal tract, less engorgement for mom, protection from multiple disease processes as infant develops, avoidance of over feeding for infant, less nipple confusion, and increased health benefits for mom  All D/C instructions given with use of  phone   was Marialuisa Nam #[id number] along with d/c packet in [facility]  Maternal Information     MOTHER:  Donna Farley  Maternal Age: 20 y o    OB History: #: 1, Date: 19, Sex: Female, Weight: 3030 g (6 lb 10 9 oz), GA: 39w4d, Delivery: Vaginal, Spontaneous, Apgar1: 8, Apgar5: 9, Living: Living, Birth Comments: None   Previouse breast reduction surgery?  No    Lactation history:   Has patient previously breast fed: No   How long had patient previously breast fed:     Previous breast feeding complications:       Past Surgical History:   Procedure Laterality Date  NO PAST SURGERIES         Birth information:  YOB: 2020   Time of birth: 1:35 PM   Sex: female   Delivery type: Vaginal, Spontaneous   Birth Weight: 3030 g (6 lb 10 9 oz)   Percent of Weight Change: -5%     Gestational Age: 43w3d   [unfilled]    Assessment        Latch: Grasps breast, tongue down, lips flanged, rhythmic sucking   Audible Swallowing: A few with stimulation   Type of Nipple: Everted (After stimulation)   Comfort (Breast/Nipple): Soft/non-tender   Hold (Positioning): No assist from staff, mother able to position/hold infant   LATCH Score: 9          Feeding recommendations:  breast feed on demand    Kun Weaver RN 8/18/2019 12:57 PM

## 2019-08-18 NOTE — PLAN OF CARE
Problem: POSTPARTUM  Goal: Experiences normal postpartum course  Description  INTERVENTIONS:  - Monitor maternal vital signs  - Assess uterine involution and lochia  2019 by Cintia Wilkes RN  Outcome: Progressing  2019 by Cintia Wilkes RN  Outcome: Progressing  Goal: Appropriate maternal -  bonding  Description  INTERVENTIONS:  - Identify family support  - Assess for appropriate maternal/infant bonding   -Encourage maternal/infant bonding opportunities  - Referral to  or  as needed  2019 by Cintia Wilkes RN  Outcome: Progressing  2019 by Cintia Wilkes RN  Outcome: Progressing  Goal: Establishment of infant feeding pattern  Description  INTERVENTIONS:  - Assess breast/bottle feeding  - Refer to lactation as needed  2019 by Cintia Wilkes RN  Outcome: Progressing  2019 by Cintia Wilkes RN  Outcome: Progressing  Goal: Incision(s), wounds(s) or drain site(s) healing without S/S of infection  Description  INTERVENTIONS  - Assess and document risk factors for skin impairment   - Assess and document dressing, incision, wound bed, drain sites and surrounding tissue  - Consider nutrition services referral as needed  - Oral mucous membranes remain intact  - Provide patient/ family education  2019 by Cintia Wilkes RN  Outcome: Progressing  2019 by Cintia Wilkes RN  Outcome: Progressing

## 2019-08-19 LAB — RPR SER QL: NORMAL

## 2019-08-19 NOTE — UTILIZATION REVIEW
Notification of Maternity Inpatient Admission/Maternity Inpatient Authorization Request  This is a Notification of Maternity Inpatient Admission/Maternity Inpatient Authorization Request to our facility [address] [place] [address]  Please be advised that this patient is currently in our facility under Inpatient Status  Below you will find the Birth/ Summary, Attending Physician and Facilitys information including NPI#  and contact information for the Utilization Review Department where the patient is receiving care services  Facility: [address] [place] [address]  Address: [address] [place] [address] [address]   Phone: [phone] Tax ID: [id number]  NPI: [id number]  Medicare ID: [id number]    Place of Service Code: 24   Place of Service Name: Inpatient Hospital  Presentation Date & Time: 2019  4:38 AM  Inpatient Admission Date & Time: 19 4697  Discharge Date & Time: 2019  2:40 PM   Discharge Disposition (if discharged): Home/Self Care  Attending Physician & NPI: Levy Hobbs, Figueroa Mcdonald [[id number]]  Attending Physician:  JAROCHO Desir    Specialty- Obstetrics and Gynecology  [address] [id number]  [address] [place] [address], [facility] [place][note 2] [address]  Phone 1: [phone]  Fax: [phone]  Mother of [place] Information: Hanh Baird   MRN: [id number] YOB: 2000   Mother's Admitting Diagnosis: Encounter for full-term uncomplicated delivery [F27]  Estimated Date of Delivery: 19  Type of Delivery: Vaginal, Spontaneous  ICD 10:   Delivering clinician: Karmen Talbot   OB History        1    Para   1    Term   1            AB        Living   1       SAB        TAB        Ectopic        Multiple   0    Live Births   1                Name & MRN:   Information for the patient's :  Guillermina Mccarthy Girl Kinjal Sovereign) [[id number]]     [place] Delivery Information:  Sex: female  Delivered 2019 1:35 PM by Vaginal, Spontaneous; Gestational Age: 43w3d    [place] Measurements:  Weight: 6 lb 10 9 oz (3030 g); Height: 19 5"    APGAR 1 minute 5 minutes 10 minutes   Totals: 8 9      Thank you,  [facility] Review Department  Phone: Elizabeth Ward   - [phone]; Fax [phone]  ATTENTION: Please call with any questions or concerns to [phone]  and carefully follow the prompts so that you are directed to the right person  Send all requests for admission clinical reviews, approved or denied determinations and any other requests to fax [phone]   All voicemails are confidential

## 2019-08-24 LAB — PLACENTA IN STORAGE: NORMAL

## 2019-09-09 ENCOUNTER — POSTPARTUM VISIT (OUTPATIENT)
Dept: OBGYN CLINIC | Facility: CLINIC | Age: 19
End: 2019-09-09

## 2019-09-09 VITALS
WEIGHT: 126.8 LBS | DIASTOLIC BLOOD PRESSURE: 60 MMHG | HEIGHT: 63 IN | SYSTOLIC BLOOD PRESSURE: 100 MMHG | BODY MASS INDEX: 22.47 KG/M2

## 2019-09-09 PROCEDURE — 99213 OFFICE O/P EST LOW 20 MIN: CPT | Performed by: OBSTETRICS & GYNECOLOGY

## 2019-09-09 NOTE — PROGRESS NOTES
POSTPARTUM VISIT    Jennifer Christie presents today for postpartum visit  She had a vaginal delivery on 8-16-19  Complications included 3rd degree laceration  She is breast bottle and feeding her infant and reports no issues with such  She desires Nexplanon for contraception  She was provided with Alvaro Bejarano Depression Screening tool and her score was 2  Review of Systems:   -Constitutional: denies issues, denies pain   -Breasts: denies tenderness   -Gynecologic: lochia normal   -Urinary: denies issues urinating   -GI: stools WNL, denies issues    Physical Exam:   -Vitals:   Vitals:    09/09/19 1136   BP: 100/60   Weight: 57 5 kg (126 lb 12 8 oz)   Height: 5' 3" (1 6 m)      -General: A&Ox3, no acute distress noted   -Abdomen: soft, non-tender   -Extremities: nontender, no edema   -Pelvic exam:    External genitalia: nontender, no lesions or masses, perineum healing well  Vagina: pink, rugae, no lesions/masses, normal discharge          Assessment/Plan:  1  Normal postpartum exam   2  Depression screening negative     Advise return for next annual GYN exam in July 2020  Sherre Soulier Contraception: plan on Nexplanon

## 2019-09-18 ENCOUNTER — PROCEDURE VISIT (OUTPATIENT)
Dept: OBGYN CLINIC | Facility: CLINIC | Age: 19
End: 2019-09-18

## 2019-09-18 VITALS
WEIGHT: 129 LBS | HEIGHT: 63 IN | DIASTOLIC BLOOD PRESSURE: 60 MMHG | SYSTOLIC BLOOD PRESSURE: 110 MMHG | BODY MASS INDEX: 22.86 KG/M2

## 2019-09-18 DIAGNOSIS — Z30.017 NEXPLANON INSERTION: ICD-10-CM

## 2019-09-18 DIAGNOSIS — Z30.09 UNWANTED FERTILITY: Primary | ICD-10-CM

## 2019-09-18 PROBLEM — Z3A.39 39 WEEKS GESTATION OF PREGNANCY: Status: RESOLVED | Noted: 2019-08-16 | Resolved: 2019-09-18

## 2019-09-18 PROBLEM — Z36.9 ENCOUNTER FOR FETAL ULTRASOUND: Status: RESOLVED | Noted: 2019-02-20 | Resolved: 2019-09-18

## 2019-09-18 LAB — SL AMB POCT URINE HCG: NEGATIVE

## 2019-09-18 PROCEDURE — 11981 INSERTION DRUG DLVR IMPLANT: CPT | Performed by: NURSE PRACTITIONER

## 2019-09-18 PROCEDURE — 81025 URINE PREGNANCY TEST: CPT | Performed by: NURSE PRACTITIONER

## 2019-09-18 NOTE — PROGRESS NOTES
Remove and insert drug implant  Date/Time: 9/18/2019 1:18 PM  Performed by: Zeno Prader, CRNP  Authorized by: Zeno Prader, CRNP     Consent:     Consent obtained:  Verbal and written    Consent given by:  Patient    Procedural risks discussed:  Bleeding, infection and failure rate    Patient questions answered: yes      Patient agrees, verbalizes understanding, and wants to proceed: yes      Educational handouts given: yes      Instructions and paperwork completed: yes    Indication:     Indication: Insertion of non-biodegradable drug delivery implant    Pre-procedure:     Pre-procedure timeout performed: yes      Prepped with: povidone-iodine      Local anesthetic:  Lidocaine without epinephrine    The site was cleaned and prepped in a sterile fashion: yes    Procedure:     Procedure:   Insertion    Left/right:  Left    Preloaded contraceptive capsule trocar was placed subdermally: yes      Visualization of implant was obtained: yes      Contraceptive capsule was inserted and trocar removed: yes      Visualization of notch in stylet and palpation of device: yes      Palpation confirms placement by provider and patient: yes      Site was closed with steri-strips and pressure bandage applied: yes

## 2019-09-30 ENCOUNTER — TELEPHONE (OUTPATIENT)
Dept: OBGYN CLINIC | Facility: CLINIC | Age: 19
End: 2019-09-30

## 2020-01-13 ENCOUNTER — HOSPITAL ENCOUNTER (EMERGENCY)
Facility: HOSPITAL | Age: 20
Discharge: HOME | End: 2020-01-13
Attending: EMERGENCY MEDICINE | Admitting: EMERGENCY MEDICINE
Payer: COMMERCIAL

## 2020-01-13 VITALS
BODY MASS INDEX: 23.28 KG/M2 | SYSTOLIC BLOOD PRESSURE: 121 MMHG | WEIGHT: 131.39 LBS | HEART RATE: 98 BPM | DIASTOLIC BLOOD PRESSURE: 66 MMHG | OXYGEN SATURATION: 100 % | TEMPERATURE: 97.9 F | RESPIRATION RATE: 20 BRPM

## 2020-01-13 DIAGNOSIS — R51.9 HEADACHE: Primary | ICD-10-CM

## 2020-01-13 LAB
EXT PREG TEST URINE: NEGATIVE
EXT. CONTROL ED NAV: NORMAL

## 2020-01-13 PROCEDURE — 96374 THER/PROPH/DIAG INJ IV PUSH: CPT

## 2020-01-13 PROCEDURE — 96361 HYDRATE IV INFUSION ADD-ON: CPT

## 2020-01-13 PROCEDURE — 99283 EMERGENCY DEPT VISIT LOW MDM: CPT | Performed by: PHYSICIAN ASSISTANT

## 2020-01-13 PROCEDURE — 99283 EMERGENCY DEPT VISIT LOW MDM: CPT

## 2020-01-13 PROCEDURE — 81025 URINE PREGNANCY TEST: CPT | Performed by: PHYSICIAN ASSISTANT

## 2020-01-13 PROCEDURE — 96375 TX/PRO/DX INJ NEW DRUG ADDON: CPT

## 2020-01-13 RX ORDER — KETOROLAC TROMETHAMINE 30 MG/ML
15 INJECTION, SOLUTION INTRAMUSCULAR; INTRAVENOUS ONCE
Status: COMPLETED | OUTPATIENT
Start: 2020-01-13 | End: 2020-01-13

## 2020-01-13 RX ORDER — DIPHENHYDRAMINE HYDROCHLORIDE 50 MG/ML
25 INJECTION INTRAMUSCULAR; INTRAVENOUS ONCE
Status: COMPLETED | OUTPATIENT
Start: 2020-01-13 | End: 2020-01-13

## 2020-01-13 RX ORDER — METOCLOPRAMIDE HYDROCHLORIDE 5 MG/ML
10 INJECTION INTRAMUSCULAR; INTRAVENOUS ONCE
Status: COMPLETED | OUTPATIENT
Start: 2020-01-13 | End: 2020-01-13

## 2020-01-13 RX ADMIN — SODIUM CHLORIDE 1000 ML: 0.9 INJECTION, SOLUTION INTRAVENOUS at 21:14

## 2020-01-13 RX ADMIN — KETOROLAC TROMETHAMINE 15 MG: 30 INJECTION, SOLUTION INTRAMUSCULAR at 21:16

## 2020-01-13 RX ADMIN — DIPHENHYDRAMINE HYDROCHLORIDE 25 MG: 50 INJECTION INTRAMUSCULAR; INTRAVENOUS at 21:16

## 2020-01-13 RX ADMIN — METOCLOPRAMIDE 10 MG: 5 INJECTION, SOLUTION INTRAMUSCULAR; INTRAVENOUS at 21:15

## 2020-01-14 NOTE — ED PROVIDER NOTES
History  Chief Complaint   Patient presents with    Headache     39-year-old female presenting for evaluation of headache  Patient reports gradual onset of headache since yesterday  Patient states she has had headaches in the past however this one has lasted longer  Pt reports pain like a band across her forehead  She reports taking Tylenol yesterday without relief  She reports photophobia but no phonophobia  No blurry vision loss of vision dizziness  She denies any numbness or tingling of the arms or legs  Denies any injury to the head or neck  Denies any fevers  Prior to Admission Medications   Prescriptions Last Dose Informant Patient Reported? Taking?    Prenatal Vit-Fe Fumarate-FA (PRENATAL VITAMIN) 27-0 8 MG TABS   No No   Sig: Take 1 tablet by mouth daily   acetaminophen (TYLENOL) 325 mg tablet   No No   Sig: Take 2 tablets (650 mg total) by mouth every 4 (four) hours as needed for mild pain or headaches   docusate sodium (COLACE) 100 mg capsule   No No   Sig: Take 1 capsule (100 mg total) by mouth 2 (two) times a day   etonogestrel (NEXPLANON) subdermal implant   No No   Si mg by Subdermal route once for 1 dose   hydrocortisone 1 % cream   No No   Sig: Apply 1 application topically as needed for irritation or rash   ibuprofen (MOTRIN) 600 mg tablet   No No   Sig: Take 1 tablet (600 mg total) by mouth every 6 (six) hours as needed for mild pain, moderate pain, fever or headaches   witch hazel-glycerin (TUCKS) topical pad   No No   Sig: Apply 1 pad topically as needed for irritation (perineal)      Facility-Administered Medications: None       Past Medical History:   Diagnosis Date    Medical history non-contributory        Past Surgical History:   Procedure Laterality Date    NO PAST SURGERIES         Family History   Problem Relation Age of Onset    No Known Problems Mother     No Known Problems Father     No Known Problems Sister     No Known Problems Brother     Diabetes Maternal Grandmother     Hyperlipidemia Maternal Grandmother     No Known Problems Maternal Grandfather     Heart disease Paternal Grandmother     Asthma Paternal Grandmother     Cancer Neg Hx      I have reviewed and agree with the history as documented  Social History     Tobacco Use    Smoking status: Never Smoker    Smokeless tobacco: Never Used   Substance Use Topics    Alcohol use: No    Drug use: No        Review of Systems   All other systems reviewed and are negative  Physical Exam  Physical Exam   Constitutional: She is oriented to person, place, and time  She appears well-developed and well-nourished  No distress  HENT:   Head: Normocephalic and atraumatic  Right Ear: External ear normal    Left Ear: External ear normal    Eyes: Pupils are equal, round, and reactive to light  Conjunctivae and EOM are normal    Neck: Normal range of motion  Neck supple  No JVD present  Cardiovascular: Normal rate  Pulmonary/Chest: Effort normal    Abdominal: Soft  Musculoskeletal:   FROM, steady gait, cap refill brisk, strength and sensation grossly intact throughout   Neurological: She is alert and oriented to person, place, and time  She has normal strength  She is not disoriented  No cranial nerve deficit or sensory deficit  She displays a negative Romberg sign  Coordination and gait normal  GCS eye subscore is 4  GCS verbal subscore is 5  GCS motor subscore is 6  Skin: Skin is warm and dry  Capillary refill takes less than 2 seconds  She is not diaphoretic  Psychiatric: She has a normal mood and affect  Her behavior is normal    Nursing note and vitals reviewed        Vital Signs  ED Triage Vitals [01/13/20 2045]   Temperature Pulse Respirations Blood Pressure SpO2   97 9 °F (36 6 °C) 98 20 121/66 100 %      Temp Source Heart Rate Source Patient Position - Orthostatic VS BP Location FiO2 (%)   Tympanic Monitor Sitting Left arm --      Pain Score       9           Vitals:    01/13/20 2045 BP: 121/66   Pulse: 98   Patient Position - Orthostatic VS: Sitting         Visual Acuity  Visual Acuity      Most Recent Value   L Pupil Size (mm)  3          ED Medications  Medications   sodium chloride 0 9 % bolus 1,000 mL (1,000 mL Intravenous New Bag 1/13/20 2114)   ketorolac (TORADOL) injection 15 mg (15 mg Intravenous Given 1/13/20 2116)   metoclopramide (REGLAN) injection 10 mg (10 mg Intravenous Given 1/13/20 2115)   diphenhydrAMINE (BENADRYL) injection 25 mg (25 mg Intravenous Given 1/13/20 2116)       Diagnostic Studies  Results Reviewed     Procedure Component Value Units Date/Time    POCT pregnancy, urine [[id number]]  (Normal) Resulted:  01/13/20 2105    Lab Status:  Final result Updated:  01/13/20 2122     EXT PREG TEST UR (Ref: Negative) Negative     Control Valid                 No orders to display              Procedures  Procedures         ED Course                               MDM  Number of Diagnoses or Management Options  Diagnosis management comments: 21-year-old female presenting for evaluation of gradual onset of headache, patient was given iron cocktail here with complete resolution of her headache, she has no focal neurological deficits, is asking to go home at this time, she appears well nontoxic no acute distress, she is afebrile, follow up with PCP as an outpatient    strict return to ED precautions discussed  Pt verbalizes understanding and agrees with plan  Pt is stable for discharge    Portions of the record may have been created with voice recognition software  Occasional wrong word or "sound a like" substitutions may have occurred due to the inherent limitations of voice recognition software  Read the chart carefully and recognize, using context, where substitutions have occurred            Disposition  Final diagnoses:   Headache     Time reflects when diagnosis was documented in both MDM as applicable and the Disposition within this note     Time User Action Codes Description Comment    1/13/2020 10:22 PM Griselda Degroot Add [R51] Headache       ED Disposition     ED Disposition Condition Date/Time Comment    Discharge Stable Mon Jan 13, 2020 10:22 PM Bella Pham discharge to home/self care  Follow-up Information     Follow up With Specialties Details Why Contact Info    Cornel Ace DO Family Medicine Call in 1 day  [address] [phone]            Patient's Medications   Discharge Prescriptions    No medications on file     No discharge procedures on file      ED Provider  Electronically Signed by           Jacey Garibay PA-C  01/13/20 0884

## 2023-02-01 ENCOUNTER — OFFICE VISIT (OUTPATIENT)
Dept: OBGYN CLINIC | Facility: CLINIC | Age: 23
End: 2023-02-01

## 2023-02-01 VITALS
WEIGHT: 135.2 LBS | HEART RATE: 86 BPM | HEIGHT: 63 IN | BODY MASS INDEX: 23.96 KG/M2 | DIASTOLIC BLOOD PRESSURE: 73 MMHG | SYSTOLIC BLOOD PRESSURE: 109 MMHG

## 2023-02-01 DIAGNOSIS — Z30.09 UNWANTED FERTILITY: Primary | ICD-10-CM

## 2023-02-01 NOTE — PROGRESS NOTES
PROBLEM GYNECOLOGICAL VISIT    Rylee Cunningham is a 20 y o  female who presents today with complaint of unwanted fertility  Her general medical history has been reviewed and she reports it as follows:    Past Medical History:   Diagnosis Date   • No known health problems      Past Surgical History:   Procedure Laterality Date   • NO PAST SURGERIES       OB History        1    Para   1    Term   1       0    AB   0    Living   1       SAB   0    IAB   0    Ectopic   0    Multiple   0    Live Births   1               Social History     Tobacco Use   • Smoking status: Never   • Smokeless tobacco: Never   Vaping Use   • Vaping Use: Never used   Substance Use Topics   • Alcohol use: Never   • Drug use: Never     Social History     Substance and Sexual Activity   Sexual Activity Yes   • Partners: Male   • Birth control/protection: Implant       Current Outpatient Medications   Medication Instructions   • etonogestrel (NEXPLANON) 68 mg, Subdermal, Once       History of Present Illness:   Patient had Nexplanon implant inserted 2019  It has now  and she desires to have it removed and have a new Nexplanon inserted to continue contraception therapy  She denies any issues with the Nexplanon and only has occasional irregular vaginal spotting  Review of Systems:  Review of Systems   Constitutional: Negative  Gastrointestinal: Negative  Genitourinary: Negative  Physical Exam:  /73   Pulse 86   Ht 5' 3" (1 6 m)   Wt 61 3 kg (135 lb 3 2 oz)   LMP 2023   BMI 23 95 kg/m²   Physical Exam  Constitutional:       General: She is not in acute distress  Neurological:      Mental Status: She is alert  Skin:     General: Skin is warm and dry  Vitals reviewed  Assessment:   1  Unwanted fertility  Plan:   1  Desires Nexplanon removal and reinsertion of new one    We will obtain insurance authorization and once device is delivered here to clinic, we will then contact patient to schedule insertion procedure  We will remove old implant at that time  2  Return to office in 3 months for annual GYN exam and first ever pap smear  3  Patient's depression screening was assessed with a PHQ-2 score of 0  Their PHQ-9 score was 0  Clinically patient does not have depression  No treatment is required

## 2023-02-02 ENCOUNTER — TELEPHONE (OUTPATIENT)
Dept: OBGYN CLINIC | Facility: CLINIC | Age: 23
End: 2023-02-02

## 2023-03-14 ENCOUNTER — HOSPITAL ENCOUNTER (EMERGENCY)
Facility: HOSPITAL | Age: 23
Discharge: HOME | End: 2023-03-14
Attending: EMERGENCY MEDICINE

## 2023-03-14 ENCOUNTER — APPOINTMENT (EMERGENCY)
Dept: CT IMAGING | Facility: HOSPITAL | Age: 23
End: 2023-03-14

## 2023-03-14 VITALS
OXYGEN SATURATION: 98 % | BODY MASS INDEX: 24.17 KG/M2 | WEIGHT: 136.47 LBS | HEART RATE: 74 BPM | TEMPERATURE: 98.3 F | RESPIRATION RATE: 16 BRPM | DIASTOLIC BLOOD PRESSURE: 58 MMHG | SYSTOLIC BLOOD PRESSURE: 111 MMHG

## 2023-03-14 DIAGNOSIS — R10.9 ABDOMINAL PAIN: ICD-10-CM

## 2023-03-14 DIAGNOSIS — N83.202 LEFT OVARIAN CYST: Primary | ICD-10-CM

## 2023-03-14 LAB
ANION GAP SERPL CALCULATED.3IONS-SCNC: 6 MMOL/L (ref 4–13)
BACTERIA UR QL AUTO: ABNORMAL /HPF
BASOPHILS # BLD AUTO: 0.04 THOUSANDS/ÂΜL (ref 0–0.1)
BASOPHILS NFR BLD AUTO: 1 % (ref 0–1)
BILIRUB UR QL STRIP: NEGATIVE
BUN SERPL-MCNC: 10 MG/DL (ref 5–25)
CALCIUM SERPL-MCNC: 9.4 MG/DL (ref 8.4–10.2)
CHLORIDE SERPL-SCNC: 104 MMOL/L (ref 96–108)
CLARITY UR: CLEAR
CO2 SERPL-SCNC: 26 MMOL/L (ref 21–32)
COLOR UR: YELLOW
CREAT SERPL-MCNC: 0.58 MG/DL (ref 0.6–1.3)
EOSINOPHIL # BLD AUTO: 0.14 THOUSAND/ÂΜL (ref 0–0.61)
EOSINOPHIL NFR BLD AUTO: 3 % (ref 0–6)
ERYTHROCYTE [DISTWIDTH] IN BLOOD BY AUTOMATED COUNT: 11.9 % (ref 11.6–15.1)
EXT PREGNANCY TEST URINE: NEGATIVE
EXT. CONTROL: NORMAL
GFR SERPL CREATININE-BSD FRML MDRD: 130 ML/MIN/1.73SQ M
GLUCOSE SERPL-MCNC: 86 MG/DL (ref 65–140)
GLUCOSE UR STRIP-MCNC: NEGATIVE MG/DL
HCT VFR BLD AUTO: 43.2 % (ref 34.8–46.1)
HGB BLD-MCNC: 14 G/DL (ref 11.5–15.4)
HGB UR QL STRIP.AUTO: ABNORMAL
IMM GRANULOCYTES # BLD AUTO: 0.01 THOUSAND/UL (ref 0–0.2)
IMM GRANULOCYTES NFR BLD AUTO: 0 % (ref 0–2)
KETONES UR STRIP-MCNC: NEGATIVE MG/DL
LEUKOCYTE ESTERASE UR QL STRIP: NEGATIVE
LIPASE SERPL-CCNC: 27 U/L (ref 11–82)
LYMPHOCYTES # BLD AUTO: 1.89 THOUSANDS/ÂΜL (ref 0.6–4.47)
LYMPHOCYTES NFR BLD AUTO: 33 % (ref 14–44)
MCH RBC QN AUTO: 30.9 PG (ref 26.8–34.3)
MCHC RBC AUTO-ENTMCNC: 32.4 G/DL (ref 31.4–37.4)
MCV RBC AUTO: 95 FL (ref 82–98)
MONOCYTES # BLD AUTO: 0.67 THOUSAND/ÂΜL (ref 0.17–1.22)
MONOCYTES NFR BLD AUTO: 12 % (ref 4–12)
NEUTROPHILS # BLD AUTO: 2.93 THOUSANDS/ÂΜL (ref 1.85–7.62)
NEUTS SEG NFR BLD AUTO: 51 % (ref 43–75)
NITRITE UR QL STRIP: NEGATIVE
NON-SQ EPI CELLS URNS QL MICRO: ABNORMAL /HPF
NRBC BLD AUTO-RTO: 0 /100 WBCS
OTHER STN SPEC: ABNORMAL
PH UR STRIP.AUTO: 5 [PH] (ref 4.5–8)
PLATELET # BLD AUTO: 238 THOUSANDS/UL (ref 149–390)
PMV BLD AUTO: 9.8 FL (ref 8.9–12.7)
POTASSIUM SERPL-SCNC: 3.8 MMOL/L (ref 3.5–5.3)
PROT UR STRIP-MCNC: NEGATIVE MG/DL
RBC # BLD AUTO: 4.53 MILLION/UL (ref 3.81–5.12)
RBC #/AREA URNS AUTO: ABNORMAL /HPF
SODIUM SERPL-SCNC: 136 MMOL/L (ref 135–147)
SP GR UR STRIP.AUTO: 1.01 (ref 1–1.03)
UROBILINOGEN UR QL STRIP.AUTO: 0.2 E.U./DL
WBC # BLD AUTO: 5.68 THOUSAND/UL (ref 4.31–10.16)
WBC #/AREA URNS AUTO: ABNORMAL /HPF

## 2023-03-14 RX ORDER — NAPROXEN 500 MG/1
500 TABLET ORAL 2 TIMES DAILY WITH MEALS
Qty: 20 TABLET | Refills: 0 | Status: SHIPPED | OUTPATIENT
Start: 2023-03-14

## 2023-03-14 RX ORDER — KETOROLAC TROMETHAMINE 30 MG/ML
15 INJECTION, SOLUTION INTRAMUSCULAR; INTRAVENOUS ONCE
Status: COMPLETED | OUTPATIENT
Start: 2023-03-14 | End: 2023-03-14

## 2023-03-14 RX ADMIN — KETOROLAC TROMETHAMINE 15 MG: 30 INJECTION, SOLUTION INTRAMUSCULAR; INTRAVENOUS at 13:00

## 2023-03-14 RX ADMIN — IOHEXOL 100 ML: 350 INJECTION, SOLUTION INTRAVENOUS at 14:11

## 2023-03-14 NOTE — ED PROVIDER NOTES
History  Chief Complaint   Patient presents with   • Abdominal Pain     Luq pain x4 days  Denies n/v/d     20 y o  F p/w left sided pain x 4 days  Squeezing, intermittent  Worse with eating  Denies N/V/D, urinary complaints  Pt reports she is 5 days late with her period  History provided by:  Patient  Abdominal Pain  Pain location:  LUQ and LLQ  Pain quality: squeezing    Timing:  Intermittent  Chronicity:  New  Worsened by:  Eating  Associated symptoms: no diarrhea, no dysuria, no nausea and no vomiting        Prior to Admission Medications   Prescriptions Last Dose Informant Patient Reported? Taking?   etonogestrel (NEXPLANON) subdermal implant   No No   Si mg by Subdermal route once for 1 dose      Facility-Administered Medications: None       Past Medical History:   Diagnosis Date   • No known health problems        Past Surgical History:   Procedure Laterality Date   • NO PAST SURGERIES         Family History   Problem Relation Age of Onset   • No Known Problems Mother    • No Known Problems Father    • No Known Problems Sister    • No Known Problems Brother    • Diabetes Maternal Grandmother    • Hyperlipidemia Maternal Grandmother    • No Known Problems Maternal Grandfather    • Heart disease Paternal Grandmother    • Asthma Paternal Grandmother    • Cancer Neg Hx    • Breast cancer Neg Hx    • Colon cancer Neg Hx    • Ovarian cancer Neg Hx      I have reviewed and agree with the history as documented  E-Cigarette/Vaping   • E-Cigarette Use Never User      E-Cigarette/Vaping Substances     Social History     Tobacco Use   • Smoking status: Never   • Smokeless tobacco: Never   Vaping Use   • Vaping Use: Never used   Substance Use Topics   • Alcohol use: Never   • Drug use: Never       Review of Systems   Gastrointestinal: Positive for abdominal pain  Negative for diarrhea, nausea and vomiting  Genitourinary: Negative for dysuria and frequency     All other systems reviewed and are negative  Physical Exam  Physical Exam  Vitals and nursing note reviewed  Constitutional:       General: She is not in acute distress  Appearance: Normal appearance  She is well-developed  She is not ill-appearing, toxic-appearing or diaphoretic  HENT:      Head: Normocephalic and atraumatic  Eyes:      General: No scleral icterus  Neck:      Vascular: No JVD  Trachea: Trachea normal    Cardiovascular:      Rate and Rhythm: Normal rate and regular rhythm  Heart sounds: Normal heart sounds  No murmur heard  No friction rub  Pulmonary:      Effort: Pulmonary effort is normal  No accessory muscle usage or respiratory distress  Breath sounds: Normal breath sounds  No stridor  No wheezing, rhonchi or rales  Abdominal:      General: There is no distension  Palpations: Abdomen is soft  Abdomen is not rigid  There is no mass  Tenderness: There is abdominal tenderness in the left upper quadrant and left lower quadrant  There is no guarding or rebound  Negative signs include Hammond's sign and McBurney's sign  Musculoskeletal:      Cervical back: Normal range of motion  Skin:     General: Skin is warm and dry  Coloration: Skin is not pale  Findings: No rash  Neurological:      Mental Status: She is alert  GCS: GCS eye subscore is 4  GCS verbal subscore is 5  GCS motor subscore is 6     Psychiatric:         Behavior: Behavior normal          Vital Signs  ED Triage Vitals [03/14/23 1155]   Temperature Pulse Respirations Blood Pressure SpO2   98 3 °F (36 8 °C) 77 16 119/68 100 %      Temp Source Heart Rate Source Patient Position - Orthostatic VS BP Location FiO2 (%)   Oral Monitor Sitting Right arm --      Pain Score       9           Vitals:    03/14/23 1155 03/14/23 1549   BP: 119/68 111/58   Pulse: 77 74   Patient Position - Orthostatic VS: Sitting Sitting         Visual Acuity      ED Medications  Medications   ketorolac (TORADOL) injection 15 mg (15 mg Intravenous Given 3/14/23 1300)   iohexol (OMNIPAQUE) 350 MG/ML injection (SINGLE-DOSE) 100 mL (100 mL Intravenous Given 3/14/23 1411)       Diagnostic Studies  Results Reviewed     Procedure Component Value Units Date/Time    Urine Microscopic [[id number]]  (Abnormal) Collected: 03/14/23 1211    Lab Status: Final result Specimen: Urine, Clean Catch Updated: 03/14/23 1342     RBC, UA 2-4 /hpf      WBC, UA 1-2 /hpf      Epithelial Cells Occasional /hpf      Bacteria, UA Occasional /hpf      OTHER OBSERVATIONS Renal Epithelial Cells Present    Lipase [[id number]]  (Normal) Collected: 03/14/23 1259    Lab Status: Final result Specimen: Blood from Arm, Right Updated: 03/14/23 1330     Lipase 27 u/L     Basic metabolic panel [[id number]]  (Abnormal) Collected: 03/14/23 1259    Lab Status: Final result Specimen: Blood from Arm, Right Updated: 03/14/23 1330     Sodium 136 mmol/L      Potassium 3 8 mmol/L      Chloride 104 mmol/L      CO2 26 mmol/L      ANION GAP 6 mmol/L      BUN 10 mg/dL      Creatinine 0 58 mg/dL      Glucose 86 mg/dL      Calcium 9 4 mg/dL      eGFR 130 ml/min/1 73sq m     Narrative:      [place] guidelines for Chronic Kidney Disease (CKD):   •  Stage 1 with normal or high GFR (GFR > 90 mL/min/1 73 square meters)  •  Stage 2 Mild CKD (GFR = 60-89 mL/min/1 73 square meters)  •  Stage 3A Moderate CKD (GFR = 45-59 mL/min/1 73 square meters)  •  Stage 3B Moderate CKD (GFR = 30-44 mL/min/1 73 square meters)  •  Stage 4 Severe CKD (GFR = 15-29 mL/min/1 73 square meters)  •  Stage 5 End Stage CKD (GFR <15 mL/min/1 73 square meters)  Note: GFR calculation is accurate only with a steady state creatinine    CBC and differential [[id number]] Collected: 03/14/23 1259    Lab Status: Final result Specimen: Blood from Arm, Right Updated: 03/14/23 1308     WBC 5 68 Thousand/uL      RBC 4 53 Million/uL      Hemoglobin 14 0 g/dL      Hematocrit 43 2 %      MCV 95 fL      MCH 30 9 pg      MCHC 32 4 g/dL      RDW 11 9 %      MPV 9 8 fL      Platelets 798 Thousands/uL      nRBC 0 /100 WBCs      Neutrophils Relative 51 %      Immat GRANS % 0 %      Lymphocytes Relative 33 %      Monocytes Relative 12 %      Eosinophils Relative 3 %      Basophils Relative 1 %      Neutrophils Absolute 2 93 Thousands/µL      Immature Grans Absolute 0 01 Thousand/uL      Lymphocytes Absolute 1 89 Thousands/µL      Monocytes Absolute 0 67 Thousand/µL      Eosinophils Absolute 0 14 Thousand/µL      Basophils Absolute 0 04 Thousands/µL     POCT pregnancy, urine [[id number]]  (Normal) Resulted: 03/14/23 1213    Lab Status: Final result Updated: 03/14/23 1213     EXT Preg Test, Ur Negative     Control Valid    Urine Macroscopic, POC [[id number]]  (Abnormal) Collected: 03/14/23 1211    Lab Status: Final result Specimen: Urine Updated: 03/14/23 1212     Color, UA Yellow     Clarity, UA Clear     pH, UA 5 0     Leukocytes, UA Negative     Nitrite, UA Negative     Protein, UA Negative mg/dl      Glucose, UA Negative mg/dl      Ketones, UA Negative mg/dl      Urobilinogen, UA 0 2 E U /dl      Bilirubin, UA Negative     Occult Blood, UA Trace     Specific [place], UA 1 010    Narrative:      CL[facility] RESULT                 CT abdomen pelvis with contrast   ED Interpretation by DO Kaylie (03/14 1601)   Left ovarian cyst   No obvious acute intra-abdominal pathology      Final Result by Lizeth North MD (03/14 1550)      Left ovarian cyst or dominant follicle measuring up to 3 8 cm  Otherwise no acute pathology and specifically, no other CT findings to possibly accounting for the patient's symptoms              Workstation performed: [id number]                    Procedures  Procedures         ED Course  ED Course as of 03/14/23 1614   Tue Mar 14, 2023   1215 PREGNANCY TEST URINE: Negative   1308 CBC and differential  Normal   9818 Basic metabolic panel(!)  Normal   1330 Lipase  Normal   1612 Updated pt on CT result and instructed her to f/u with OBGYN  SBIRT 22yo+    Flowsheet Row Most Recent Value   SBIRT (23 yo +)    In order to provide better care to our patients, we are screening all of our patients for alcohol and drug use  Would it be okay to ask you these screening questions? No Filed at: 03/14/2023 1302                    Medical Decision Making  Will check BMP to r/o NADYA, lipase to r/o pancreatitis, CT A/P to r/o diverticulitis/ovarian cyst/ureteral stone  Amount and/or Complexity of Data Reviewed  Labs: ordered  Decision-making details documented in ED Course  Radiology: ordered  Risk  Prescription drug management  Disposition  Final diagnoses:   Left ovarian cyst   Abdominal pain     Time reflects when diagnosis was documented in both MDM as applicable and the Disposition within this note     Time User Action Codes Description Comment    3/14/2023  4:02 PM Jus De La Vega 48 [N83 202] Left ovarian cyst     3/14/2023  4:02 PM Jus De La Vega 48 [R10 9] Abdominal pain       ED Disposition     ED Disposition   Discharge    Condition   Stable    Date/Time   Tue Mar 14, 2023  4:03 PM    Comment   Beverly Anand discharge to home/self care                 Follow-up Information     Follow up With Specialties Details Why 2 [facility] Schedule an appointment as soon as possible for a visit  For follow up of cyst [address]  [address] [facility] [address]  [address] [place] [address], [address] [place] [address] Rd, [address] [place] [address], [id number] [phone]          Patient's Medications   Discharge Prescriptions    NAPROXEN (NAPROSYN) 500 MG TABLET    Take 1 tablet (500 mg total) by mouth 2 (two) times a day with meals       Start Date: 3/14/2023 End Date: --       Order Dose: 500 mg       Quantity: 20 tablet    Refills: 0       No discharge procedures on file      PDMP Review     None          ED Provider  Electronically Signed by           [address],   03/14/23 5970

## 2023-05-03 ENCOUNTER — OFFICE VISIT (OUTPATIENT)
Dept: OBGYN CLINIC | Facility: CLINIC | Age: 23
End: 2023-05-03

## 2023-05-03 VITALS
DIASTOLIC BLOOD PRESSURE: 79 MMHG | HEART RATE: 83 BPM | SYSTOLIC BLOOD PRESSURE: 117 MMHG | WEIGHT: 141.2 LBS | BODY MASS INDEX: 25.02 KG/M2 | HEIGHT: 63 IN

## 2023-05-03 DIAGNOSIS — Z01.419 ENCOUNTER FOR GYNECOLOGICAL EXAMINATION WITHOUT ABNORMAL FINDING: Primary | ICD-10-CM

## 2023-05-03 DIAGNOSIS — Z12.39 ENCOUNTER FOR BREAST CANCER SCREENING USING NON-MAMMOGRAM MODALITY: ICD-10-CM

## 2023-05-03 DIAGNOSIS — Z30.09 UNWANTED FERTILITY: ICD-10-CM

## 2023-05-03 DIAGNOSIS — Z11.3 SCREEN FOR STD (SEXUALLY TRANSMITTED DISEASE): ICD-10-CM

## 2023-05-03 DIAGNOSIS — Z30.017 NEXPLANON INSERTION: ICD-10-CM

## 2023-05-03 DIAGNOSIS — Z12.4 SCREENING FOR CERVICAL CANCER: ICD-10-CM

## 2023-05-03 NOTE — PROGRESS NOTES
"Andrea is a 20 y o  female who presents today for annual GYN exam   She has never had a pap smear performed previously  She reports menses as absent due to Nexplanon implant  Patient's last menstrual period was 2023  Her general medical history has been reviewed and she reports it as follows:    Past Medical History:   Diagnosis Date    No known health problems      Past Surgical History:   Procedure Laterality Date    NO PAST SURGERIES       OB History        1    Para   1    Term   1       0    AB   0    Living   1       SAB   0    IAB   0    Ectopic   0    Multiple   0    Live Births   1               Social History     Tobacco Use    Smoking status: Never    Smokeless tobacco: Never   Vaping Use    Vaping Use: Never used   Substance Use Topics    Alcohol use: Never    Drug use: Never     Social History     Substance and Sexual Activity   Sexual Activity Yes    Partners: Male    Birth control/protection: Implant     Cancer-related family history is negative for Cancer, Breast cancer, Colon cancer, and Ovarian cancer  Current Outpatient Medications   Medication Instructions    etonogestrel (NEXPLANON) 68 mg, Subdermal, Once       Review of Systems:  Review of Systems   Constitutional: Negative  Gastrointestinal: Negative  Genitourinary: Negative for difficulty urinating, menstrual problem, pelvic pain and vaginal discharge  Skin: Negative  Physical Exam:  /79   Pulse 83   Ht 5' 3\" (1 6 m)   Wt 64 kg (141 lb 3 2 oz)   LMP 2023   BMI 25 01 kg/m²   Physical Exam  Constitutional:       General: She is not in acute distress  Appearance: She is well-developed  Genitourinary:      Vulva normal       No lesions in the vagina  Right Adnexa: not tender and no mass present  Left Adnexa: not tender and no mass present  No cervical motion tenderness or lesion  Uterus is not tender   " Breasts:     Right: No mass, nipple discharge, skin change or tenderness  Left: No mass, nipple discharge, skin change or tenderness  Neck:      Thyroid: No thyromegaly  Cardiovascular:      Rate and Rhythm: Normal rate and regular rhythm  Pulmonary:      Effort: Pulmonary effort is normal    Abdominal:      Palpations: Abdomen is soft  Tenderness: There is no abdominal tenderness  Musculoskeletal:      Cervical back: Neck supple  Neurological:      Mental Status: She is alert and oriented to person, place, and time  Skin:     General: Skin is warm and dry  Vitals reviewed  Assessment/Plan:   1  Normal well-woman GYN exam   2  Cervical cancer screening:  Normal cervical exam   Pap smear done with HPV reflex  3  STD screening:  Orders placed for vaginal GC/CT cultures  Orders placed for serum anti-HIV, anti-HCV, HbsAg, syphilis panel  4  Breast cancer screening:  Normal breast exam   Reviewed breast self-awareness  5  Depression Screening: Patient's depression screening was assessed with a PHQ-2 score of 0  Their PHQ-9 score was 0  Clinically patient does not have depression  No treatment is required  6  BMI Counseling: Body mass index is 25 01 kg/m²  No intervention indicated  7  Contraception:  Nexplanon  Desires new Nexplanon  Now that patient has health insurance, we will obtain insurance authorization and once device is delivered here to clinic, we will then contact patient to schedule insertion procedure     8  Return to office in 1 year for annual GYN exam

## 2023-05-03 NOTE — PATIENT INSTRUCTIONS
Huseyin por pineda confianza en nuestro equipo  Lorra Rissa y agradecemos rolando comentarios  Si recibe trinh encuesta nuestra, tómese unos momentos para informarnos cómo estamos     Sinceramente,  [address]

## 2023-05-03 NOTE — LETTER
2023    To Flex Altagracia  : 2000      Esta carta es para informarle que rolando resultados recientes de la prueba de Papanicolaou fueron revisados por mí y son Dorys Perez    Nos vemos en 1 año para pineda [address]

## 2023-05-03 NOTE — LETTER
2023    To Sarah De La Rosa  : 2000      Esta carta es para informarle que rolando CULTURAS recientes para la gonorrea y la clamidia fueron revisadas por mí y son [place]    Comuníquese con la oficina para trinh jessica si tiene alguna inquietud [address]

## 2023-05-05 LAB
C TRACH DNA SPEC QL NAA+PROBE: NEGATIVE
N GONORRHOEA DNA SPEC QL NAA+PROBE: NEGATIVE

## 2023-05-09 LAB
LAB AP GYN PRIMARY INTERPRETATION: NORMAL
Lab: NORMAL

## 2023-05-22 ENCOUNTER — PROCEDURE VISIT (OUTPATIENT)
Dept: OBGYN CLINIC | Facility: CLINIC | Age: 23
End: 2023-05-22

## 2023-05-22 VITALS
SYSTOLIC BLOOD PRESSURE: 108 MMHG | DIASTOLIC BLOOD PRESSURE: 73 MMHG | HEART RATE: 83 BPM | HEIGHT: 63 IN | BODY MASS INDEX: 25.59 KG/M2 | WEIGHT: 144.4 LBS

## 2023-05-22 DIAGNOSIS — Z30.017 NEXPLANON INSERTION: Primary | ICD-10-CM

## 2023-05-22 LAB — SL AMB POCT URINE HCG: NEGATIVE

## 2023-05-22 RX ORDER — LIDOCAINE HYDROCHLORIDE 20 MG/ML
3 INJECTION, SOLUTION EPIDURAL; INFILTRATION; INTRACAUDAL; PERINEURAL ONCE
Status: SHIPPED | OUTPATIENT
Start: 2023-05-22

## 2023-05-22 RX ORDER — LIDOCAINE HYDROCHLORIDE AND EPINEPHRINE BITARTRATE 20; .01 MG/ML; MG/ML
1 INJECTION, SOLUTION SUBCUTANEOUS ONCE
Status: COMPLETED | OUTPATIENT
Start: 2023-05-22 | End: 2023-05-22

## 2023-05-22 RX ADMIN — LIDOCAINE HYDROCHLORIDE AND EPINEPHRINE BITARTRATE 1 ML: 20; .01 INJECTION, SOLUTION SUBCUTANEOUS at 16:12

## 2023-05-22 NOTE — PROGRESS NOTES
"Universal Protocol:  Procedure performed by: (Dr Kishore Elias)  Consent: Verbal consent obtained  Written consent obtained  Risks and benefits: risks, benefits and alternatives were discussed  Consent given by: patient  Time out: Immediately prior to procedure a \"time out\" was called to verify the correct patient, procedure, equipment, support staff and site/side marked as required  Patient understanding: patient states understanding of the procedure being performed  Patient consent: the patient's understanding of the procedure matches consent given  Procedure consent: procedure consent matches procedure scheduled  Patient identity confirmed: verbally with patient    Remove and insert drug implant    Date/Time: 5/22/2023 8:16 AM  Performed by: Aria Goodson MD  Authorized by: Aria Goodson MD     Indication:     Indication: Insertion of non-biodegradable drug delivery implant    Pre-procedure:     Pre-procedure timeout performed: yes      Prepped with: alcohol 70% and povidone-iodine      Local anesthetic:  Lidocaine with epinephrine    The site was cleaned and prepped in a sterile fashion: yes    Procedure:     Procedure: Insertion    Small stab incision was made in arm: no      Left/right:  Left    Preloaded contraceptive capsule trocar was placed subdermally: yes      Visualization of implant was obtained: yes      Contraceptive capsule was inserted and trocar removed: yes      Visualization of notch in stylet and palpation of device: yes      Palpation confirms placement by provider and patient: yes      Site was closed with steri-strips and pressure bandage applied: yes    Comments:      Pt states she was not switching from another method, chart review reveals prior nexplanon  Will return for removal of old nexplanon          "

## 2023-09-16 ENCOUNTER — HOSPITAL ENCOUNTER (EMERGENCY)
Facility: HOSPITAL | Age: 23
Discharge: HOME | End: 2023-09-16
Attending: EMERGENCY MEDICINE
Payer: MEDICARE

## 2023-09-16 VITALS
HEART RATE: 81 BPM | RESPIRATION RATE: 16 BRPM | WEIGHT: 150.13 LBS | SYSTOLIC BLOOD PRESSURE: 133 MMHG | TEMPERATURE: 98.2 F | DIASTOLIC BLOOD PRESSURE: 76 MMHG | BODY MASS INDEX: 26.59 KG/M2 | OXYGEN SATURATION: 99 %

## 2023-09-16 DIAGNOSIS — H10.9 BILATERAL CONJUNCTIVITIS: Primary | ICD-10-CM

## 2023-09-16 PROCEDURE — 99282 EMERGENCY DEPT VISIT SF MDM: CPT

## 2023-09-16 PROCEDURE — 99284 EMERGENCY DEPT VISIT MOD MDM: CPT | Performed by: EMERGENCY MEDICINE

## 2023-09-16 RX ORDER — TOBRAMYCIN 3 MG/ML
2 SOLUTION/ DROPS OPHTHALMIC ONCE
Status: COMPLETED | OUTPATIENT
Start: 2023-09-16 | End: 2023-09-16

## 2023-09-16 RX ORDER — TOBRAMYCIN 3 MG/ML
2 SOLUTION/ DROPS OPHTHALMIC
Qty: 5 ML | Refills: 0 | Status: SHIPPED | OUTPATIENT
Start: 2023-09-16

## 2023-09-16 RX ADMIN — TOBRAMYCIN 2 DROP: 3 SOLUTION OPHTHALMIC at 14:47

## 2023-09-16 NOTE — ED PROVIDER NOTES
History  Chief Complaint   Patient presents with   • Eye Redness     Pt reports b/l eye pain, itchiness, burning, and redness x3 days. Pt has tried OTC medications with no relief. 69-year-old female here for evaluation of bilateral eye redness, discharge, itchiness. Symptoms have been present for about 3 days. Reports mild blurring of vision with increased discharge but otherwise no visual deficits. Eyes are itchy and somewhat painful. She wakes up in the morning with crusting, purulent discharge. Unsure of known sick contacts. No fevers. No pain with eye movement. Able to tolerate p.o. without difficulty. History provided by:  Patient      Prior to Admission Medications   Prescriptions Last Dose Informant Patient Reported? Taking?   etonogestrel (NEXPLANON) subdermal implant   No No   Si mg by Subdermal route once for 1 dose      Facility-Administered Medications Last Administration Doses Remaining   lidocaine (PF) (XYLOCAINE-MPF) 2 % injection 3 mL None recorded 1          Past Medical History:   Diagnosis Date   • No known health problems        Past Surgical History:   Procedure Laterality Date   • NO PAST SURGERIES         Family History   Problem Relation Age of Onset   • No Known Problems Mother    • No Known Problems Father    • No Known Problems Sister    • No Known Problems Brother    • Diabetes Maternal Grandmother    • Hyperlipidemia Maternal Grandmother    • No Known Problems Maternal Grandfather    • Heart disease Paternal Grandmother    • Asthma Paternal Grandmother    • Cancer Neg Hx    • Breast cancer Neg Hx    • Colon cancer Neg Hx    • Ovarian cancer Neg Hx      I have reviewed and agree with the history as documented.     E-Cigarette/Vaping   • E-Cigarette Use Never User      E-Cigarette/Vaping Substances     Social History     Tobacco Use   • Smoking status: Never   • Smokeless tobacco: Never   Vaping Use   • Vaping Use: Never used   Substance Use Topics   • Alcohol use: Never   • Drug use: Never       Review of Systems   Eyes: Positive for pain, discharge, redness and itching. Negative for photophobia and visual disturbance. Respiratory: Negative for shortness of breath. Cardiovascular: Negative for chest pain and palpitations. Gastrointestinal: Negative for abdominal pain, nausea and vomiting. Physical Exam  Physical Exam  Constitutional:       Appearance: Normal appearance. She is obese. She is not toxic-appearing. HENT:      Head: Normocephalic and atraumatic. Nose: Nose normal.      Mouth/Throat:      Mouth: Mucous membranes are moist.      Pharynx: Oropharynx is clear. Eyes:      General: No scleral icterus. Right eye: Discharge present. Left eye: Discharge present. Extraocular Movements: Extraocular movements intact. Pupils: Pupils are equal, round, and reactive to light. Comments: Bilateral conjunctival erythema with mild to moderate purulent discharge. Patient is grossly normal.  Pupils are equal and reactive bilaterally. Normal EOM without significant pain. Cardiovascular:      Rate and Rhythm: Normal rate and regular rhythm. Pulses: Normal pulses. Pulmonary:      Effort: Pulmonary effort is normal. No respiratory distress. Breath sounds: Normal breath sounds. Abdominal:      General: There is no distension. Palpations: Abdomen is soft. Tenderness: There is no abdominal tenderness. Musculoskeletal:         General: No swelling, tenderness or signs of injury. Normal range of motion. Cervical back: Normal range of motion and neck supple. No rigidity. Skin:     General: Skin is warm and dry. Capillary Refill: Capillary refill takes less than 2 seconds. Findings: No rash. Neurological:      General: No focal deficit present. Mental Status: She is alert and oriented to person, place, and time.    Psychiatric:         Mood and Affect: Mood normal.         Thought Content: Thought content normal.         Vital Signs  ED Triage Vitals   Temperature Pulse Respirations Blood Pressure SpO2   09/16/23 1420 09/16/23 1417 09/16/23 1417 09/16/23 1417 09/16/23 1417   98.2 °F (36.8 °C) 81 16 133/76 99 %      Temp Source Heart Rate Source Patient Position - Orthostatic VS BP Location FiO2 (%)   09/16/23 1420 09/16/23 1417 09/16/23 1417 09/16/23 1417 --   Oral Monitor Sitting Right arm       Pain Score       09/16/23 1417       9           Vitals:    09/16/23 1417   BP: 133/76   Pulse: 81   Patient Position - Orthostatic VS: Sitting         Visual Acuity      ED Medications  Medications   tobramycin (TOBREX) 0.3 % ophthalmic solution 2 drop (has no administration in time range)       Diagnostic Studies  Results Reviewed     None                 No orders to display              Procedures  Procedures         ED Course                               SBIRT 22yo+    Flowsheet Row Most Recent Value   Initial Alcohol Screen: US AUDIT-C     1. How often do you have a drink containing alcohol? 0 Filed at: 09/16/2023 1419   2. How many drinks containing alcohol do you have on a typical day you are drinking? 0 Filed at: 09/16/2023 1419   3a. Male UNDER 65: How often do you have five or more drinks on one occasion? 0 Filed at: 09/16/2023 1419   3b. FEMALE Any Age, or MALE 65+: How often do you have 4 or more drinks on one occassion? 0 Filed at: 09/16/2023 1419   Audit-C Score 0 Filed at: 09/16/2023 1419   ROBERTO: How many times in the past year have you. .. Used an illegal drug or used a prescription medication for non-medical reasons? Never Filed at: 09/16/2023 1419                    Medical Decision Making  50-year-old female with typical symptoms and physical exam findings consistent with bilateral conjunctivitis. Plan for treatment with antibiotic gtt. Follow-up with ophthalmology, discussed return precautions.     Bilateral conjunctivitis: acute illness or injury  Risk  Prescription drug management. Disposition  Final diagnoses:   Bilateral conjunctivitis     Time reflects when diagnosis was documented in both MDM as applicable and the Disposition within this note     Time User Action Codes Description Comment    9/16/2023  2:40 PM Onur Romeo Karina [H10.9] Bilateral conjunctivitis       ED Disposition     ED Disposition   Discharge    Condition   Stable    Date/Time   Sat Sep 16, 2023 [address] discharge to home/self care. Follow-up Information     Follow up With Specialties Details Why Contact Info    Eagle Hanley DO Family Medicine   [address] [place] [address] Ophthalmology   [address] [place] [address]  [phone]            Patient's Medications   Discharge Prescriptions    TOBRAMYCIN (TOBREX) 0.3 % SOLN    Administer 2 drops to both eyes every 4 (four) hours while awake       Start Date: 9/16/2023 End Date: --       Order Dose: 2 drops       Quantity: 5 mL    Refills: 0       No discharge procedures on file.     PDMP Review     None          ED Provider  Electronically Signed by           Julianne Preciado MD  09/16/23 5352

## 2023-09-16 NOTE — Clinical Note
Cassie Saul was seen and treated in our emergency department on 9/16/2023. Diagnosis:     Yeison Alvarado  may return to work on return date. She may return on this date: 09/19/2023         If you have any questions or concerns, please don't hesitate to call.       Fahad Black MD    ______________________________           _______________          _______________  Hospital Representative                              Date                                Time

## 2024-01-11 ENCOUNTER — APPOINTMENT (EMERGENCY)
Dept: RADIOLOGY | Facility: HOSPITAL | Age: 24
End: 2024-01-11
Payer: MEDICARE

## 2024-01-11 ENCOUNTER — HOSPITAL ENCOUNTER (EMERGENCY)
Facility: HOSPITAL | Age: 24
Discharge: HOME | End: 2024-01-11
Attending: EMERGENCY MEDICINE
Payer: MEDICARE

## 2024-01-11 VITALS
DIASTOLIC BLOOD PRESSURE: 64 MMHG | SYSTOLIC BLOOD PRESSURE: 112 MMHG | HEART RATE: 81 BPM | OXYGEN SATURATION: 99 % | TEMPERATURE: 98 F | RESPIRATION RATE: 20 BRPM | BODY MASS INDEX: 27.14 KG/M2 | WEIGHT: 153.22 LBS

## 2024-01-11 DIAGNOSIS — R07.9 CHEST PAIN: ICD-10-CM

## 2024-01-11 DIAGNOSIS — R42 DIZZINESS: Primary | ICD-10-CM

## 2024-01-11 LAB
ALBUMIN SERPL BCP-MCNC: 4.6 G/DL (ref 3.5–5)
ALP SERPL-CCNC: 58 U/L (ref 34–104)
ALT SERPL W P-5'-P-CCNC: 14 U/L (ref 7–52)
ANION GAP SERPL CALCULATED.3IONS-SCNC: 5 MMOL/L
APTT PPP: 29 SECONDS (ref 23–37)
AST SERPL W P-5'-P-CCNC: 12 U/L (ref 13–39)
BACTERIA UR QL AUTO: ABNORMAL /HPF
BASOPHILS # BLD AUTO: 0.04 THOUSANDS/ÂΜL (ref 0–0.1)
BASOPHILS NFR BLD AUTO: 1 % (ref 0–1)
BILIRUB SERPL-MCNC: 0.34 MG/DL (ref 0.2–1)
BILIRUB UR QL STRIP: NEGATIVE
BUN SERPL-MCNC: 14 MG/DL (ref 5–25)
CALCIUM SERPL-MCNC: 9.9 MG/DL (ref 8.4–10.2)
CARDIAC TROPONIN I PNL SERPL HS: <2 NG/L
CHLORIDE SERPL-SCNC: 105 MMOL/L (ref 96–108)
CLARITY UR: CLEAR
CO2 SERPL-SCNC: 29 MMOL/L (ref 21–32)
COLOR UR: YELLOW
CREAT SERPL-MCNC: 0.71 MG/DL (ref 0.6–1.3)
EOSINOPHIL # BLD AUTO: 0.19 THOUSAND/ÂΜL (ref 0–0.61)
EOSINOPHIL NFR BLD AUTO: 2 % (ref 0–6)
ERYTHROCYTE [DISTWIDTH] IN BLOOD BY AUTOMATED COUNT: 12.2 % (ref 11.6–15.1)
EXT PREGNANCY TEST URINE: NEGATIVE
EXT. CONTROL: NORMAL
FLUAV RNA RESP QL NAA+PROBE: NEGATIVE
FLUBV RNA RESP QL NAA+PROBE: NEGATIVE
GFR SERPL CREATININE-BSD FRML MDRD: 119 ML/MIN/1.73SQ M
GLUCOSE SERPL-MCNC: 69 MG/DL (ref 65–140)
GLUCOSE UR STRIP-MCNC: NEGATIVE MG/DL
HCT VFR BLD AUTO: 41.8 % (ref 34.8–46.1)
HGB BLD-MCNC: 13.8 G/DL (ref 11.5–15.4)
HGB UR QL STRIP.AUTO: ABNORMAL
IMM GRANULOCYTES # BLD AUTO: 0.02 THOUSAND/UL (ref 0–0.2)
IMM GRANULOCYTES NFR BLD AUTO: 0 % (ref 0–2)
INR PPP: 1.05 (ref 0.84–1.19)
KETONES UR STRIP-MCNC: NEGATIVE MG/DL
LEUKOCYTE ESTERASE UR QL STRIP: ABNORMAL
LYMPHOCYTES # BLD AUTO: 2.51 THOUSANDS/ÂΜL (ref 0.6–4.47)
LYMPHOCYTES NFR BLD AUTO: 32 % (ref 14–44)
MAGNESIUM SERPL-MCNC: 1.9 MG/DL (ref 1.9–2.7)
MCH RBC QN AUTO: 30.8 PG (ref 26.8–34.3)
MCHC RBC AUTO-ENTMCNC: 33 G/DL (ref 31.4–37.4)
MCV RBC AUTO: 93 FL (ref 82–98)
MONOCYTES # BLD AUTO: 1.18 THOUSAND/ÂΜL (ref 0.17–1.22)
MONOCYTES NFR BLD AUTO: 15 % (ref 4–12)
MUCOUS THREADS UR QL AUTO: ABNORMAL
NEUTROPHILS # BLD AUTO: 4.03 THOUSANDS/ÂΜL (ref 1.85–7.62)
NEUTS SEG NFR BLD AUTO: 50 % (ref 43–75)
NITRITE UR QL STRIP: NEGATIVE
NON-SQ EPI CELLS URNS QL MICRO: ABNORMAL /HPF
NRBC BLD AUTO-RTO: 0 /100 WBCS
PH UR STRIP.AUTO: 6.5 [PH] (ref 4.5–8)
PLATELET # BLD AUTO: 276 THOUSANDS/UL (ref 149–390)
PMV BLD AUTO: 9.6 FL (ref 8.9–12.7)
POTASSIUM SERPL-SCNC: 3.9 MMOL/L (ref 3.5–5.3)
PROT SERPL-MCNC: 7.8 G/DL (ref 6.4–8.4)
PROT UR STRIP-MCNC: NEGATIVE MG/DL
PROTHROMBIN TIME: 13.9 SECONDS (ref 11.6–14.5)
RBC # BLD AUTO: 4.48 MILLION/UL (ref 3.81–5.12)
RBC #/AREA URNS AUTO: ABNORMAL /HPF
RSV RNA RESP QL NAA+PROBE: NEGATIVE
SARS-COV-2 RNA RESP QL NAA+PROBE: NEGATIVE
SODIUM SERPL-SCNC: 139 MMOL/L (ref 135–147)
SP GR UR STRIP.AUTO: >=1.03 (ref 1–1.03)
UROBILINOGEN UR QL STRIP.AUTO: 0.2 E.U./DL
WBC # BLD AUTO: 7.97 THOUSAND/UL (ref 4.31–10.16)
WBC #/AREA URNS AUTO: ABNORMAL /HPF

## 2024-01-11 PROCEDURE — 71045 X-RAY EXAM CHEST 1 VIEW: CPT

## 2024-01-11 PROCEDURE — 81025 URINE PREGNANCY TEST: CPT | Performed by: EMERGENCY MEDICINE

## 2024-01-11 PROCEDURE — 93005 ELECTROCARDIOGRAM TRACING: CPT

## 2024-01-11 PROCEDURE — 83735 ASSAY OF MAGNESIUM: CPT | Performed by: EMERGENCY MEDICINE

## 2024-01-11 PROCEDURE — 81001 URINALYSIS AUTO W/SCOPE: CPT

## 2024-01-11 PROCEDURE — 36415 COLL VENOUS BLD VENIPUNCTURE: CPT | Performed by: EMERGENCY MEDICINE

## 2024-01-11 PROCEDURE — 99284 EMERGENCY DEPT VISIT MOD MDM: CPT

## 2024-01-11 PROCEDURE — 96360 HYDRATION IV INFUSION INIT: CPT

## 2024-01-11 PROCEDURE — 84484 ASSAY OF TROPONIN QUANT: CPT | Performed by: EMERGENCY MEDICINE

## 2024-01-11 PROCEDURE — 80053 COMPREHEN METABOLIC PANEL: CPT | Performed by: EMERGENCY MEDICINE

## 2024-01-11 PROCEDURE — 0241U HB NFCT DS VIR RESP RNA 4 TRGT: CPT | Performed by: EMERGENCY MEDICINE

## 2024-01-11 PROCEDURE — 99285 EMERGENCY DEPT VISIT HI MDM: CPT | Performed by: EMERGENCY MEDICINE

## 2024-01-11 PROCEDURE — 85730 THROMBOPLASTIN TIME PARTIAL: CPT | Performed by: EMERGENCY MEDICINE

## 2024-01-11 PROCEDURE — 85025 COMPLETE CBC W/AUTO DIFF WBC: CPT | Performed by: EMERGENCY MEDICINE

## 2024-01-11 PROCEDURE — 85610 PROTHROMBIN TIME: CPT | Performed by: EMERGENCY MEDICINE

## 2024-01-11 RX ADMIN — SODIUM CHLORIDE 1000 ML: 0.9 INJECTION, SOLUTION INTRAVENOUS at 22:08

## 2024-01-12 LAB
ATRIAL RATE: 92 BPM
P AXIS: 73 DEGREES
PR INTERVAL: 150 MS
QRS AXIS: 78 DEGREES
QRSD INTERVAL: 72 MS
QT INTERVAL: 338 MS
QTC INTERVAL: 417 MS
T WAVE AXIS: 67 DEGREES
VENTRICULAR RATE: 92 BPM

## 2024-01-12 NOTE — ED PROVIDER NOTES
History  Chief Complaint   Patient presents with    Dizziness     Pt c/o dizziness, chest pain and palpitations x3days.      Patient is a healthy 23-year-old female coming in today complaining of dizziness chest pain and palpitations intermittently over the past 3 days.   used.  Patient cannot tell me what brings this on.  She states all 3, not once last several seconds to minutes and resolved.  She has no syncope lower extremity edema.  She denies any nausea vomiting or diarrhea.  She denies any abdominal pain.  She has been tolerating p.o. well with good urine output.  She has no dysuria, urinary frequency or urgency.  No diarrhea.  She has no falls or injury.  No recent travel sick contacts.  She has no fevers or chills.  She states that she intermittently will get a headache as well.      History provided by:  Patient and medical records   used: Yes (Marcia  [id number])    Chest Pain  Pain location:  L chest and R chest  Pain quality: stabbing    Pain radiates to:  Does not radiate  Pain radiates to the back: no    Pain severity:  Moderate  Onset quality:  Gradual  Duration:  3 days  Timing:  Intermittent  Progression:  Waxing and waning  Chronicity:  New  Context: at rest    Relieved by:  Nothing  Worsened by:  Nothing tried  Ineffective treatments:  Aspirin  Associated symptoms: dizziness and palpitations    Associated symptoms: no abdominal pain, no AICD problem, no altered mental status, no anorexia, no anxiety, no back pain, no claudication, no cough, no diaphoresis, no dysphagia, no fatigue, no fever, no headache, no heartburn, no lower extremity edema, no nausea, no near-syncope, no numbness, no orthopnea, no PND, no shortness of breath, no syncope and not vomiting    Risk factors: no aortic disease, no birth control, no coronary artery disease, no diabetes mellitus, no Irma-Danlos syndrome, no high cholesterol, no hypertension, no immobilization, no Marfan's syndrome,  not obese, no prior DVT/PE, no smoking and no surgery        Prior to Admission Medications   Prescriptions Last Dose Informant Patient Reported? Taking?   etonogestrel (NEXPLANON) subdermal implant   No No   Si mg by Subdermal route once for 1 dose   tobramycin (TOBREX) 0.3 % SOLN   No No   Sig: Administer 2 drops to both eyes every 4 (four) hours while awake      Facility-Administered Medications Last Administration Doses Remaining   lidocaine (PF) (XYLOCAINE-MPF) 2 % injection 3 mL None recorded 1          Past Medical History:   Diagnosis Date    No known health problems        Past Surgical History:   Procedure Laterality Date    NO PAST SURGERIES         Family History   Problem Relation Age of Onset    No Known Problems Mother     No Known Problems Father     No Known Problems Sister     No Known Problems Brother     Diabetes Maternal Grandmother     Hyperlipidemia Maternal Grandmother     No Known Problems Maternal Grandfather     Heart disease Paternal Grandmother     Asthma Paternal Grandmother     Cancer Neg Hx     Breast cancer Neg Hx     Colon cancer Neg Hx     Ovarian cancer Neg Hx      I have reviewed and agree with the history as documented.    E-Cigarette/Vaping    E-Cigarette Use Never User      E-Cigarette/Vaping Substances     Social History     Tobacco Use    Smoking status: Never    Smokeless tobacco: Never   Vaping Use    Vaping status: Never Used   Substance Use Topics    Alcohol use: Never    Drug use: Never       Review of Systems   Constitutional: Negative.  Negative for chills, diaphoresis, fatigue and fever.   HENT: Negative.  Negative for ear pain, sore throat and trouble swallowing.    Eyes: Negative.  Negative for pain and visual disturbance.   Respiratory: Negative.  Negative for cough and shortness of breath.    Cardiovascular:  Positive for chest pain and palpitations. Negative for orthopnea, claudication, syncope, PND and near-syncope.   Gastrointestinal: Negative.  Negative  for abdominal pain, anorexia, heartburn, nausea and vomiting.   Genitourinary: Negative.  Negative for dysuria and hematuria.   Musculoskeletal: Negative.  Negative for arthralgias and back pain.   Skin: Negative.  Negative for color change and rash.   Neurological:  Positive for dizziness. Negative for seizures, syncope, numbness and headaches.   Psychiatric/Behavioral: Negative.     All other systems reviewed and are negative.      Physical Exam  Physical Exam  Vitals and nursing note reviewed.   Constitutional:       General: She is not in acute distress.     Appearance: She is well-developed.   HENT:      Head: Normocephalic and atraumatic.      Comments: Patient maintaining airway and secretions. No stridor . No brawniness under tongue.          Mouth/Throat:      Mouth: Mucous membranes are moist.   Eyes:      Extraocular Movements: Extraocular movements intact.      Conjunctiva/sclera: Conjunctivae normal.      Pupils: Pupils are equal, round, and reactive to light.   Cardiovascular:      Rate and Rhythm: Normal rate and regular rhythm.      Pulses:           Radial pulses are 2+ on the right side and 2+ on the left side.        Dorsalis pedis pulses are 2+ on the right side and 2+ on the left side.      Heart sounds: Normal heart sounds, S1 normal and S2 normal. No murmur heard.  Pulmonary:      Effort: Pulmonary effort is normal. No respiratory distress.      Breath sounds: Normal breath sounds.   Abdominal:      Palpations: Abdomen is soft.      Tenderness: There is no abdominal tenderness.   Musculoskeletal:         General: No swelling.      Cervical back: Neck supple.      Right lower leg: No edema.      Left lower leg: No edema.   Skin:     General: Skin is warm and dry.      Capillary Refill: Capillary refill takes less than 2 seconds.   Neurological:      General: No focal deficit present.      Mental Status: She is alert and oriented to person, place, and time.      GCS: GCS eye subscore is 4. GCS  verbal subscore is 5. GCS motor subscore is 6.      Cranial Nerves: Cranial nerves 2-12 are intact.      Sensory: Sensation is intact.      Motor: Motor function is intact.      Coordination: Coordination is intact.      Comments: No slurred speech.  No facial asymmetry.  No tongue deviation.  Patient ambulatory throughout the ER no acute distress.  No antalgic gait.  NIH 0   Psychiatric:         Mood and Affect: Mood normal.         Behavior: Behavior normal.         Thought Content: Thought content normal.         Judgment: Judgment normal.         Vital Signs  ED Triage Vitals [01/11/24 2047]   Temperature Pulse Respirations Blood Pressure SpO2   98 °F (36.7 °C) 95 16 122/78 100 %      Temp Source Heart Rate Source Patient Position - Orthostatic VS BP Location FiO2 (%)   Oral Monitor Sitting Right arm --      Pain Score       --           Vitals:    01/11/24 2047 01/11/24 2215   BP: 122/78 112/64   Pulse: 95 81   Patient Position - Orthostatic VS: Sitting Lying         Visual Acuity      ED Medications  Medications   sodium chloride 0.9 % bolus 1,000 mL (0 mL Intravenous Stopped 1/11/24 2336)       Diagnostic Studies  Results Reviewed       Procedure Component Value Units Date/Time    FLU/RSV/COVID - if FLU/RSV clinically relevant [[id number]]  (Normal) Collected: 01/11/24 2207    Lab Status: Final result Specimen: Nares from Nose Updated: 01/11/24 2256     SARS-CoV-2 Negative     INFLUENZA A PCR Negative     INFLUENZA B PCR Negative     RSV PCR Negative    Narrative:      FOR PEDIATRIC PATIENTS - copy/paste COVID Guidelines URL to browser: [email or web address]    SARS-CoV-2 assay is a Nucleic Acid Amplification assay intended for the  qualitative detection of nucleic acid from SARS-CoV-2 in nasopharyngeal  swabs. Results are for the presumptive identification of SARS-CoV-2 RNA.    Positive results are indicative of infection with SARS-CoV-2, the virus  causing  COVID-19, but do not rule out bacterial infection or co-infection  with other viruses. Laboratories within the [place] and its  territories are required to report all positive results to the appropriate  public health authorities. Negative results do not preclude SARS-CoV-2  infection and should not be used as the sole basis for treatment or other  patient management decisions. Negative results must be combined with  clinical observations, patient history, and epidemiological information.  This test has not been FDA cleared or approved.    This test has been authorized by FDA under an Emergency Use Authorization  (EUA). This test is only authorized for the duration of time the  declaration that circumstances exist justifying the authorization of the  emergency use of an in vitro diagnostic tests for detection of SARS-CoV-2  virus and/or diagnosis of COVID-19 infection under section 564(b)(1) of  the Act, 21 U.S.C. 360bbb-3(b)(1), unless the authorization is terminated  or revoked sooner. The test has been validated but independent review by FDA  and CLIA is pending.    Test performed using [employer] GeneXpert: This RT-PCR assay targets N2,  a region unique to SARS-CoV-2. A conserved region in the E-gene was chosen  for pan-Sarbecovirus detection which includes SARS-CoV-2.    According to CMS-2020-01-R, this platform meets the definition of high-throughput technology.    HS Troponin 0hr (reflex protocol) [[id number]]  (Normal) Collected: 01/11/24 2207    Lab Status: Final result Specimen: Blood from Arm, Left Updated: 01/11/24 2241     hs TnI 0hr <2 ng/L     Comprehensive metabolic panel [[id number]]  (Abnormal) Collected: 01/11/24 2207    Lab Status: Final result Specimen: Blood from Arm, Left Updated: 01/11/24 2235     Sodium 139 mmol/L      Potassium 3.9 mmol/L      Chloride 105 mmol/L      CO2 29 mmol/L      ANION GAP 5 mmol/L      BUN 14 mg/dL      Creatinine 0.71 mg/dL      Glucose 69 mg/dL      Calcium 9.9  mg/dL      AST 12 U/L      ALT 14 U/L      Alkaline Phosphatase 58 U/L      Total Protein 7.8 g/dL      Albumin 4.6 g/dL      Total Bilirubin 0.34 mg/dL      eGFR 119 ml/min/1.73sq m     Narrative:      National Kidney Disease Foundation guidelines for Chronic Kidney Disease (CKD):     Stage 1 with normal or high GFR (GFR > 90 mL/min/1.73 square meters)    Stage 2 Mild CKD (GFR = 60-89 mL/min/1.73 square meters)    Stage 3A Moderate CKD (GFR = 45-59 mL/min/1.73 square meters)    Stage 3B Moderate CKD (GFR = 30-44 mL/min/1.73 square meters)    Stage 4 Severe CKD (GFR = 15-29 mL/min/1.73 square meters)    Stage 5 End Stage CKD (GFR <15 mL/min/1.73 square meters)  Note: GFR calculation is accurate only with a steady state creatinine    Magnesium [[id number]]  (Normal) Collected: 01/11/24 2207    Lab Status: Final result Specimen: Blood from Arm, Left Updated: 01/11/24 2235     Magnesium 1.9 mg/dL     Protime-INR [[id number]]  (Normal) Collected: 01/11/24 2207    Lab Status: Final result Specimen: Blood from Arm, Left Updated: 01/11/24 2230     Protime 13.9 seconds      INR 1.05    APTT [[id number]]  (Normal) Collected: 01/11/24 2207    Lab Status: Final result Specimen: Blood from Arm, Left Updated: 01/11/24 2230     PTT 29 seconds     Urine Microscopic [[id number]]  (Abnormal) Collected: 01/11/24 2202    Lab Status: Final result Specimen: Urine, Clean Catch Updated: 01/11/24 2223     RBC, UA 1-2 /hpf      WBC, UA 1-2 /hpf      Epithelial Cells Occasional /hpf      Bacteria, UA None Seen /hpf      MUCUS THREADS Occasional    CBC and differential [[id number]]  (Abnormal) Collected: 01/11/24 2207    Lab Status: Final result Specimen: Blood from Arm, Left Updated: 01/11/24 2219     WBC 7.97 Thousand/uL      RBC 4.48 Million/uL      Hemoglobin 13.8 g/dL      Hematocrit 41.8 %      MCV 93 fL      MCH 30.8 pg      MCHC 33.0 g/dL      RDW 12.2 %      MPV 9.6 fL      Platelets 276 Thousands/uL      nRBC 0 /100 WBCs      Neutrophils  "Relative 50 %      Immat GRANS % 0 %      Lymphocytes Relative 32 %      Monocytes Relative 15 %      Eosinophils Relative 2 %      Basophils Relative 1 %      Neutrophils Absolute 4.03 Thousands/µL      Immature Grans Absolute 0.02 Thousand/uL      Lymphocytes Absolute 2.51 Thousands/µL      Monocytes Absolute 1.18 Thousand/µL      Eosinophils Absolute 0.19 Thousand/µL      Basophils Absolute 0.04 Thousands/µL     POCT pregnancy, urine [[id number]]  (Normal) Resulted: 01/11/24 2206    Lab Status: Final result Updated: 01/11/24 2207     EXT Preg Test, Ur Negative     Control Valid    Urine Macroscopic, POC [[id number]]  (Abnormal) Collected: 01/11/24 2202    Lab Status: Final result Specimen: Urine Updated: 01/11/24 2203     Color, UA Yellow     Clarity, UA Clear     pH, UA 6.5     Leukocytes, UA Trace     Nitrite, UA Negative     Protein, UA Negative mg/dl      Glucose, UA Negative mg/dl      Ketones, UA Negative mg/dl      Urobilinogen, UA 0.2 E.U./dl      Bilirubin, UA Negative     Occult Blood, UA Trace     Specific Gravity, UA >=1.030    Narrative:      CLIN[facility] RESULT                   XR chest 1 view portable    (Results Pending)              Procedures  Procedures         ED Course  ED Course as of 01/11/24 2346   Thu Jan 11, 2024 2157 Patient is a 23-year-old female Kinyar[place]-speaking only coming in today with dizziness chest pain and overall not feeling well.  On exam patient is well-appearing in no acute distress.  NIH 0.  Will check start cardiac workup including EKG troponin.  PERC negative and low Wells.      Disclosure: Voice to text software was used in the preparation of this document and could have resulted in translational errors.      Occasional wrong word or \"sound a like\" substitutions may have occurred due to the inherent limitations of voice recognition software.  Read the chart carefully and recognize, using context, where substitutions have occurred.       I have independently reviewed " external records are available to me to the level of detail possible within the time constraints of my patient care responsibilities in the ED.       2313 Patient's flu/COVID/RSV negative.  Vital signs are stable.  No evidence of acute etiology of patient's symptoms.  Will plan for DC home with follow-up with PCP.             HEART Risk Score      Flowsheet Row Most Recent Value   Heart Score Risk Calculator    History 0 Filed at: 01/11/2024 2243   ECG 1 Filed at: 01/11/2024 2243   Age 0 Filed at: 01/11/2024 2243   Risk Factors 0 Filed at: 01/11/2024 2243   Troponin 0 Filed at: 01/11/2024 2243   HEART Score 1 Filed at: 01/11/2024 2243             Stroke Assessment       Row Name 01/11/24 2201             NIH Stroke Scale    Interval Baseline      Level of Consciousness (1a.) 0      LOC Questions (1b.) 0      LOC Commands (1c.) 0      Best Gaze (2.) 0      Visual (3.) 0      Facial Palsy (4.) 0      Motor Arm, Left (5a.) 0      Motor Arm, Right (5b.) 0      Motor Leg, Left (6a.) 0      Motor Leg, Right (6b.) 0      Limb Ataxia (7.) 0      Sensory (8.) 0      Best Language (9.) 0      Dysarthria (10.) 0      Extinction and Inattention (11.) (Formerly Neglect) 0      Total 0                    Flowsheet Row Most Recent Value   Thrombolytic Decision Options    Thrombolytic Decision Patient not a candidate.   Patient is not a candidate options Symptoms resolved/clearly non disabling.              PERC Rule for PE      Flowsheet Row Most Recent Value   PERC Rule for PE    Age >=50 --   HR >=100 0 Filed at: 01/11/2024 2205   O2 Sat on room air < 95% 0 Filed at: 01/11/2024 2205   History of PE or DVT 0 Filed at: 01/11/2024 2205   Recent trauma or surgery 0 Filed at: 01/11/2024 2205   Hemoptysis 0 Filed at: 01/11/2024 2205   Exogenous estrogen 0 Filed at: 01/11/2024 2205   Unilateral leg swelling 0 Filed at: 01/11/2024 2205   PERC Rule for PE Results 0 Filed at: 01/11/2024 2205                SBIRT 20yo+      Flowsheet Row  Most Recent Value   Initial Alcohol Screen: US AUDIT-C     1. How often do you have a drink containing alcohol? 0 Filed at: 01/11/2024 2235   2. How many drinks containing alcohol do you have on a typical day you are drinking?  0 Filed at: 01/11/2024 2235   3b. FEMALE Any Age, or MALE 65+: How often do you have 4 or more drinks on one occassion? 0 Filed at: 01/11/2024 2235   Audit-C Score 0 Filed at: 01/11/2024 2235   ROBERTO: How many times in the past year have you...    Used an illegal drug or used a prescription medication for non-medical reasons? Never Filed at: 01/11/2024 2235            Wells' Criteria for PE      Flowsheet Row Most Recent Value   Wells' Criteria for PE    Clinical signs and symptoms of DVT 0 Filed at: 01/11/2024 2205   PE is primary diagnosis or equally likely 0 Filed at: 01/11/2024 2205   HR >100 0 Filed at: 01/11/2024 2205   Immobilization at least 3 days or Surgery in the previous 4 weeks 0 Filed at: 01/11/2024 2205   Previous, objectively diagnosed PE or DVT 0 Filed at: 01/11/2024 2205   Hemoptysis 0 Filed at: 01/11/2024 2205   Malignancy with treatment within 6 months or palliative 0 Filed at: 01/11/2024 2205   Eitan' Criteria Total 0 Filed at: 01/11/2024 2205                  Medical Decision Making       EKG INTERPRETATION @ 2053  RHYTHM: Normal sinus rhythm at 90 bpm  AXIS: Normal axis  INTERVALS: NV normal measured at 150 ms  QRS COMPLEX: QRS measured at 72 ms  ST SEGMENT: Nonspecific ST segment changes.  Diffuse artifact  QT INTERVAL: QTc measured at 417 ms  COMPARED WITH PRIOR no old to compare  Interpretation by Alice Baldwin DO      Amount and/or Complexity of Data Reviewed  External Data Reviewed: notes.  Labs: ordered. Decision-making details documented in ED Course.     Details: No anemia, thrombocytopenia or leukocytosis.  No acute kidney injury or electrolyte dysfunction  Troponin less than 2 with a heart score less than 3  Pregnancy negative with no evidence of  UTI  Radiology: ordered.  ECG/medicine tests: ordered and independent interpretation performed. Decision-making details documented in ED Course.     Details: No ischemia or arrhythmia             Disposition  Final diagnoses:   Dizziness   Chest pain     Time reflects when diagnosis was documented in both MDM as applicable and the Disposition within this note       Time User Action Codes Description Comment    1/11/2024 11:13 PM Alice Baldwin [R42] Dizziness     1/11/2024 11:13 PM Alice Baldwin [R07.9] Chest pain           ED Disposition       ED Disposition   Discharge    Condition   Stable    Date/Time   Thu Jan 11, 2024 2316    Comment   Donna Farley discharge to home/self care.                   Follow-up Information       Follow up With Specialties Details Why Contact Info    Dimitris Azul DO Family Medicine Schedule an appointment as soon as possible for a visit in 1 week  [address] [facility] [address]  [phone]              Patient's Medications   Discharge Prescriptions    No medications on file       No discharge procedures on file.    PDMP Review       None            ED Provider  Electronically Signed by             Alice Baldwin DO  01/11/24 8245

## 2024-01-16 ENCOUNTER — OFFICE VISIT (OUTPATIENT)
Dept: FAMILY MEDICINE CLINIC | Facility: CLINIC | Age: 24
End: 2024-01-16

## 2024-01-16 VITALS
RESPIRATION RATE: 18 BRPM | HEIGHT: 63 IN | DIASTOLIC BLOOD PRESSURE: 70 MMHG | OXYGEN SATURATION: 98 % | BODY MASS INDEX: 28 KG/M2 | WEIGHT: 158 LBS | TEMPERATURE: 98.4 F | SYSTOLIC BLOOD PRESSURE: 104 MMHG | HEART RATE: 91 BPM

## 2024-01-16 DIAGNOSIS — R51.9 INTRACTABLE EPISODIC HEADACHE, UNSPECIFIED HEADACHE TYPE: Primary | ICD-10-CM

## 2024-01-16 PROCEDURE — 99204 OFFICE O/P NEW MOD 45 MIN: CPT | Performed by: INTERNAL MEDICINE

## 2024-01-16 RX ORDER — IBUPROFEN 600 MG/1
600 TABLET ORAL EVERY 6 HOURS PRN
Qty: 30 TABLET | Refills: 0 | Status: SHIPPED | OUTPATIENT
Start: 2024-01-16

## 2024-01-16 RX ORDER — SUMATRIPTAN 100 MG/1
100 TABLET, FILM COATED ORAL ONCE AS NEEDED
Qty: 2 TABLET | Refills: 0 | Status: SHIPPED | OUTPATIENT
Start: 2024-01-16

## 2024-01-16 NOTE — PROGRESS NOTES
Name: Donna Farely      : 2000      MRN: [id number]  Encounter Provider: Magui Farrar MD  Encounter Date: 2024   Encounter department: [facility]    Assessment & Plan     1. Intractable episodic headache, unspecified headache type  Assessment & Plan:  Intermittent episodes of headaches 2-3 times a month for 3-4 days since May. ( Corresponding with Nexplanon insertion).  Band like distribution and in the back of the head. Headache worsens by bright light.   No prior personal or family hx of migraine.    Patient stays well hydrated and consumes moderate caffeine.   No known trigger.   Differential diagnosis: migraine vs tension headache.     Plan:  - Migraine diary.   -Take sumatriptan 100 mg once and repeat does 1 hour later if headache persists. ( If headache subsides can be used to favor migraine diagnosis)   -Tylenol , do not exceed 4 gr a day.  Combine with motrin 600 mg Q6H  -discuss with Gyn for alternative contraception methods.   -F/U in 4 weeks.    Orders:  -     ibuprofen (MOTRIN) 600 mg tablet; Take 1 tablet (600 mg total) by mouth every 6 (six) hours as needed for headaches  -     SUMAtriptan (IMITREX) 100 mg tablet; Take 1 tablet (100 mg total) by mouth once as needed for migraine for up to 2 doses may repeat in 2 hours if necessary. Max dose 200mg in 24 hour period.           Subjective      24 yo Belarusian speaking female with no significant past medical history presents today following up ER visit on 24. Patient has been experiencing headache that has not been able to subside with tylenol alone and this time felt nauseated and chest pain as well which made her anxious.   ED work up was unremarkable.   Patient describes having severe headaches intermittently lasting for 3-4 days since May which corresponds with insertion of her second Nexplanon.   Patient describes feeling dizzy as well as nauseated but denies syncope, vomiting. Headaches  "are more on the sides or the back of her head and get worse with bright light or loud noises.   Supervising Doctor: Dr. Jeter present in the room with patient.    Dizziness  Associated symptoms include headaches. Pertinent negatives include no abdominal pain, coughing, fatigue, fever, joint swelling, numbness, sore throat or weakness.   Review of Systems   Constitutional:  Negative for fatigue, fever and unexpected weight change.   HENT:  Negative for facial swelling, rhinorrhea, sore throat and trouble swallowing.    Eyes:  Negative for redness and visual disturbance.   Respiratory:  Negative for cough, shortness of breath and wheezing.    Gastrointestinal:  Negative for abdominal distention, abdominal pain, constipation and diarrhea.   Endocrine: Negative for cold intolerance and heat intolerance.   Genitourinary:  Negative for dysuria, flank pain, frequency, menstrual problem and pelvic pain.   Musculoskeletal:  Negative for back pain and joint swelling.   Neurological:  Positive for dizziness and headaches. Negative for syncope, facial asymmetry, weakness, light-headedness and numbness.       Current Outpatient Medications on File Prior to Visit   Medication Sig    etonogestrel (NEXPLANON) subdermal implant 68 mg by Subdermal route once for 1 dose    [DISCONTINUED] tobramycin (TOBREX) 0.3 % SOLN Administer 2 drops to both eyes every 4 (four) hours while awake       Objective     /70 (BP Location: Left arm, Patient Position: Sitting, Cuff Size: Standard)   Pulse 91   Temp 98.4 °F (36.9 °C) (Temporal)   Resp 18   Ht 5' 3\" (1.6 m)   Wt 71.7 kg (158 lb)   SpO2 98%   BMI 27.99 kg/m²     Physical Exam  Constitutional:       General: She is not in acute distress.     Appearance: Normal appearance. She is not toxic-appearing.   HENT:      Head: Normocephalic.      Right Ear: External ear normal.      Left Ear: External ear normal.      Nose: Nose normal. No congestion or rhinorrhea.      Mouth/Throat:      " Mouth: Mucous membranes are moist.      Pharynx: No posterior oropharyngeal erythema.   Eyes:      General: No scleral icterus.     Extraocular Movements: Extraocular movements intact.      Conjunctiva/sclera: Conjunctivae normal.   Cardiovascular:      Rate and Rhythm: Normal rate and regular rhythm.      Pulses: Normal pulses.      Heart sounds: Normal heart sounds. No murmur heard.  Pulmonary:      Effort: Pulmonary effort is normal.      Breath sounds: Normal breath sounds. No wheezing.   Abdominal:      General: Abdomen is flat. Bowel sounds are normal.      Palpations: Abdomen is soft.   Musculoskeletal:         General: Normal range of motion.      Cervical back: Normal range of motion and neck supple. No rigidity.      Right lower leg: No edema.      Left lower leg: No edema.   Lymphadenopathy:      Cervical: No cervical adenopathy.   Skin:     General: Skin is warm.      Coloration: Skin is not jaundiced or pale.   Neurological:      Mental Status: She is alert and oriented to person, place, and time. Mental status is at baseline.   Psychiatric:         Mood and Affect: Mood normal.       Magui Farrar MD

## 2024-01-16 NOTE — PATIENT INSTRUCTIONS
Tylenol. Maximum dose 4 gram in 24 hours.   Take 2 tablets once . No more that 8 tablets a day.   Take Motrin 600 mg together with tylenol.     Sumatriptan 100 mg ONCE if SEVERE headache is experienced. Can repeat the dose after 1 hour if headache is still the same. Maximum 2 doses a day.

## 2024-01-16 NOTE — ASSESSMENT & PLAN NOTE
Intermittent episodes of headaches 2-3 times a month for 3-4 days since May. ( Corresponding with Nexplanon insertion).  Band like distribution and in the back of the head. Headache worsens by bright light.   No prior personal or family hx of migraine.    Patient stays well hydrated and consumes moderate caffeine.   No known trigger.   Differential diagnosis: migraine vs tension headache.     Plan:  - Migraine diary.   -Take sumatriptan 100 mg once and repeat does 1 hour later if headache persists. ( If headache subsides can be used to favor migraine diagnosis)   -Tylenol , do not exceed 4 gr a day.  Combine with motrin 600 mg Q6H  -discuss with Gyn for alternative contraception methods.   -F/U in 4 weeks.

## 2024-05-07 ENCOUNTER — ANNUAL EXAM (OUTPATIENT)
Dept: OBGYN CLINIC | Facility: CLINIC | Age: 24
End: 2024-05-07

## 2024-05-07 VITALS
HEART RATE: 72 BPM | BODY MASS INDEX: 26.97 KG/M2 | SYSTOLIC BLOOD PRESSURE: 115 MMHG | HEIGHT: 63 IN | WEIGHT: 152.2 LBS | DIASTOLIC BLOOD PRESSURE: 73 MMHG

## 2024-05-07 DIAGNOSIS — Z01.419 ENCOUNTER FOR GYNECOLOGICAL EXAMINATION WITHOUT ABNORMAL FINDING: Primary | ICD-10-CM

## 2024-05-07 DIAGNOSIS — Z12.39 ENCOUNTER FOR BREAST CANCER SCREENING USING NON-MAMMOGRAM MODALITY: ICD-10-CM

## 2024-05-07 DIAGNOSIS — Z23 NEED FOR HPV VACCINATION: ICD-10-CM

## 2024-05-07 DIAGNOSIS — Z11.3 SCREEN FOR STD (SEXUALLY TRANSMITTED DISEASE): ICD-10-CM

## 2024-05-07 DIAGNOSIS — Z12.4 SCREENING FOR CERVICAL CANCER: ICD-10-CM

## 2024-05-07 PROBLEM — R51.9 INTRACTABLE EPISODIC HEADACHE: Status: RESOLVED | Noted: 2024-01-16 | Resolved: 2024-05-07

## 2024-05-07 PROCEDURE — 90651 9VHPV VACCINE 2/3 DOSE IM: CPT | Performed by: NURSE PRACTITIONER

## 2024-05-07 PROCEDURE — 87491 CHLMYD TRACH DNA AMP PROBE: CPT | Performed by: NURSE PRACTITIONER

## 2024-05-07 PROCEDURE — 87591 N.GONORRHOEAE DNA AMP PROB: CPT | Performed by: NURSE PRACTITIONER

## 2024-05-07 PROCEDURE — 90471 IMMUNIZATION ADMIN: CPT | Performed by: NURSE PRACTITIONER

## 2024-05-07 PROCEDURE — 99395 PREV VISIT EST AGE 18-39: CPT | Performed by: NURSE PRACTITIONER

## 2024-05-07 NOTE — PROGRESS NOTES
"ANNUAL GYNECOLOGICAL EXAMINATION    Donna Farley is a 23 y.o. female who presents today for annual GYN exam.  Her last pap smear was performed 5/3/2023 and result was NILM.  She reports no history of abnormal pap smears in her past.  She had HIV screening performed 5/3/2023 and it was negative.  She reports menses as absent due to Nexplanon contraception.  Her general medical history has been reviewed and she reports it as follows:    Past Medical History:   Diagnosis Date    No known health problems      Past Surgical History:   Procedure Laterality Date    NO PAST SURGERIES       OB History          1    Para   1    Term   1       0    AB   0    Living   1         SAB   0    IAB   0    Ectopic   0    Multiple   0    Live Births   1               Social History     Tobacco Use    Smoking status: Never    Smokeless tobacco: Never   Vaping Use    Vaping status: Never Used   Substance Use Topics    Alcohol use: Never    Drug use: Never     Social History     Substance and Sexual Activity   Sexual Activity Yes    Partners: Male    Birth control/protection: Implant     Cancer-related family history is negative for Cancer, Breast cancer, Colon cancer, and Ovarian cancer.    Current Outpatient Medications   Medication Instructions    etonogestrel (NEXPLANON) 68 mg, Subdermal, Once    ibuprofen (MOTRIN) 600 mg, Oral, Every 6 hours PRN    SUMAtriptan (IMITREX) 100 mg, Oral, Once as needed, may repeat in 2 hours if necessary. Max dose 200mg in 24 hour period.       Review of Systems:  Review of Systems   Constitutional: Negative.    Gastrointestinal: Negative.    Genitourinary:  Negative for difficulty urinating, menstrual problem, pelvic pain and vaginal discharge.   Skin: Negative.        Physical Exam:  /73 (BP Location: Left arm, Patient Position: Sitting, Cuff Size: Standard)   Pulse 72   Ht 5' 3\" (1.6 m)   Wt 69 kg (152 lb 3.2 oz)   LMP  (Exact Date)   BMI 26.96 kg/m²   Physical " Exam  Constitutional:       General: She is not in acute distress.     Appearance: She is well-developed.   Genitourinary:      Vulva normal.      No lesions in the vagina.        Right Adnexa: not tender and no mass present.     Left Adnexa: not tender and no mass present.     No cervical motion tenderness or lesion.      Uterus is not tender.   Breasts:     Right: No mass, nipple discharge, skin change or tenderness.      Left: No mass, nipple discharge, skin change or tenderness.   Neck:      Thyroid: No thyromegaly.   Cardiovascular:      Rate and Rhythm: Normal rate and regular rhythm.   Pulmonary:      Effort: Pulmonary effort is normal.   Abdominal:      Palpations: Abdomen is soft.      Tenderness: There is no abdominal tenderness.   Musculoskeletal:      Cervical back: Neck supple.   Neurological:      Mental Status: She is alert and oriented to person, place, and time.   Skin:     General: Skin is warm and dry.   Vitals reviewed.       Assessment/Plan:   1. Normal well-woman GYN exam.  2. Cervical cancer screening:  Normal cervical exam.  Pap smear not indicated at this time.  Has not received HPV vaccine in the past, but she desires to initiate vaccine series now.  Given HPV vaccine today and she will return in 2 and 6 months for subsequent vaccinations.     3. STD screening:  Orders placed for vaginal GC/CT cultures.  Orders placed for serum anti-HIV, anti-HCV, HbsAg, syphilis panel.   4. Breast cancer screening:  Normal breast exam.  Reviewed breast self-awareness.   5. Depression Screening: Patient's depression screening was assessed with a PHQ-2 score of 0. Their PHQ-9 score was 0. Clinically patient does not have depression. No treatment is required.   6. BMI Counseling: Body mass index is 26.96 kg/m².  No intervention indicated.   7. Contraception:  Nexplanon.   8. Return to office in 1 year for annual GYN exam.    Reviewed with patient that test results are available in [facility] immediately, but  that they will not necessarily be reviewed by me immediately.  Explained that I will review results at my earliest opportunity and contact patient appropriately.

## 2024-05-07 NOTE — LETTER
2024    To Donna Farley  : 2000      Esta carta es para informarle que rolando CULTURAS recientes para la gonorrea y la clamidia fueron revisadas por mí y son NORMALES.  Comuníquese con la oficina para trinh jessica si tiene alguna inquietud adicional.    ALEJANDRO Patel

## 2024-05-07 NOTE — PROGRESS NOTES
Patient given initial HPV vaccine in left deltoid.     NDC# 1142-2871-08  LOT# [id number]  EXP# 01/22/2026

## 2024-05-08 LAB
C TRACH DNA SPEC QL NAA+PROBE: NEGATIVE
N GONORRHOEA DNA SPEC QL NAA+PROBE: NEGATIVE

## 2024-07-15 ENCOUNTER — CLINICAL SUPPORT (OUTPATIENT)
Dept: OBGYN CLINIC | Facility: CLINIC | Age: 24
End: 2024-07-15

## 2024-07-15 VITALS
WEIGHT: 151 LBS | BODY MASS INDEX: 26.75 KG/M2 | DIASTOLIC BLOOD PRESSURE: 70 MMHG | HEIGHT: 63 IN | SYSTOLIC BLOOD PRESSURE: 104 MMHG | HEART RATE: 90 BPM

## 2024-07-15 DIAGNOSIS — Z23 NEED FOR HPV VACCINATION: Primary | ICD-10-CM

## 2024-07-15 PROCEDURE — 90651 9VHPV VACCINE 2/3 DOSE IM: CPT

## 2024-07-15 PROCEDURE — 90471 IMMUNIZATION ADMIN: CPT

## 2024-08-31 ENCOUNTER — HOSPITAL ENCOUNTER (EMERGENCY)
Facility: HOSPITAL | Age: 24
Discharge: HOME | End: 2024-08-31
Attending: EMERGENCY MEDICINE
Payer: COMMERCIAL

## 2024-08-31 ENCOUNTER — APPOINTMENT (EMERGENCY)
Dept: NON INVASIVE DIAGNOSTICS | Facility: HOSPITAL | Age: 24
End: 2024-08-31
Payer: COMMERCIAL

## 2024-08-31 VITALS
WEIGHT: 156.53 LBS | RESPIRATION RATE: 16 BRPM | BODY MASS INDEX: 27.73 KG/M2 | SYSTOLIC BLOOD PRESSURE: 111 MMHG | HEART RATE: 88 BPM | OXYGEN SATURATION: 100 % | DIASTOLIC BLOOD PRESSURE: 57 MMHG | TEMPERATURE: 98.6 F

## 2024-08-31 DIAGNOSIS — M79.606 LEG PAIN: Primary | ICD-10-CM

## 2024-08-31 LAB
EXT PREGNANCY TEST URINE: NEGATIVE
EXT. CONTROL: NORMAL

## 2024-08-31 PROCEDURE — 81025 URINE PREGNANCY TEST: CPT

## 2024-08-31 PROCEDURE — 99283 EMERGENCY DEPT VISIT LOW MDM: CPT

## 2024-08-31 PROCEDURE — 99284 EMERGENCY DEPT VISIT MOD MDM: CPT

## 2024-08-31 RX ORDER — IBUPROFEN 600 MG/1
600 TABLET, FILM COATED ORAL ONCE
Status: COMPLETED | OUTPATIENT
Start: 2024-08-31 | End: 2024-08-31

## 2024-08-31 RX ADMIN — IBUPROFEN 600 MG: 600 TABLET, FILM COATED ORAL at 19:12

## 2024-08-31 NOTE — ED NOTES
Patient ambulatory from waiting area to exam room with a steady gait.       Mona Wen RN  08/31/24 [id number]

## 2024-08-31 NOTE — DISCHARGE INSTRUCTIONS
-there was no blood clot in your leg  -please follow up with your family doctor for your symptoms  -take motrin and tylenol as needed for pain.     -No había coágulo de selena en la pierna  -Por favor, dinesh un seguimiento con pineda médico de cabecera para rolando síntomas  -[place] Motrin y Tylenol según sea necesario para el dolor.

## 2024-08-31 NOTE — ED PROVIDER NOTES
History  Chief Complaint   Patient presents with    Foot Pain     Pain in R foot / knee for the past month. Denies injury     24 YOF presents today with complaint of right leg pain that started .  Patient reports that her lower leg became numb for about an hour and then she developed pain afterwards.  States that it is from her knee to her ankle.  Also reporting that her left leg is starting to hurt now as well.  Has not taken any medication for the pain.  Denies any swelling to her joints.  Denies any back pain.  No recurrent numbness or tingling in her legs. No recent travel.      Language barrier: yes, [place] Malay interpretor used  History obtained from: patient           Prior to Admission Medications   Prescriptions Last Dose Informant Patient Reported? Taking?   SUMAtriptan (IMITREX) 100 mg tablet   No No   Sig: Take 1 tablet (100 mg total) by mouth once as needed for migraine for up to 2 doses may repeat in 2 hours if necessary. Max dose 200mg in 24 hour period.   etonogestrel (NEXPLANON) subdermal implant   Yes No   Si mg by Subdermal route Once every 3 years   ibuprofen (MOTRIN) 600 mg tablet   No No   Sig: Take 1 tablet (600 mg total) by mouth every 6 (six) hours as needed for headaches      Facility-Administered Medications: None       Past Medical History:   Diagnosis Date    Migraines        Past Surgical History:   Procedure Laterality Date    NO PAST SURGERIES         Family History   Problem Relation Age of Onset    No Known Problems Mother     No Known Problems Father     No Known Problems Sister     No Known Problems Brother     Diabetes Maternal Grandmother     Hyperlipidemia Maternal Grandmother     No Known Problems Maternal Grandfather     Heart disease Paternal Grandmother     Asthma Paternal Grandmother     Cancer Neg Hx     Breast cancer Neg Hx     Colon cancer Neg Hx     Ovarian cancer Neg Hx      I have reviewed and agree with the history as documented.    E-Cigarette/Vaping     E-Cigarette Use Never User      E-Cigarette/Vaping Substances     Social History     Tobacco Use    Smoking status: Never    Smokeless tobacco: Never   Vaping Use    Vaping status: Never Used   Substance Use Topics    Alcohol use: Never    Drug use: Never       Review of Systems   Musculoskeletal:  Positive for myalgias.       Physical Exam  Physical Exam  Vitals and nursing note reviewed.   Constitutional:       General: She is not in acute distress.     Appearance: Normal appearance. She is well-developed. She is not ill-appearing.   HENT:      Head: Normocephalic and atraumatic.   Eyes:      Conjunctiva/sclera: Conjunctivae normal.   Cardiovascular:      Rate and Rhythm: Normal rate.   Pulmonary:      Effort: Pulmonary effort is normal.   Musculoskeletal:         General: Tenderness present. No swelling. Normal range of motion.      Cervical back: Normal range of motion and neck supple.      Comments: Right calf TTP, no swelling. Sensation and pulses intact. No overlying skin changes. Left leg normal, no swelling. No overlying skin changes. Pulses and sensation normal.    Skin:     General: Skin is warm and dry.   Neurological:      Mental Status: She is alert.   Psychiatric:         Mood and Affect: Mood normal.         Behavior: Behavior normal.         Vital Signs  ED Triage Vitals   Temperature Pulse Respirations Blood Pressure SpO2   08/31/24 1820 08/31/24 1820 08/31/24 1820 08/31/24 1820 08/31/24 1820   98.6 °F (37 °C) 88 16 111/57 100 %      Temp Source Heart Rate Source Patient Position - Orthostatic VS BP Location FiO2 (%)   08/31/24 1820 08/31/24 1820 08/31/24 1820 08/31/24 1820 --   Temporal Monitor Sitting Right arm       Pain Score       08/31/24 1912       7           Vitals:    08/31/24 1820   BP: 111/57   Pulse: 88   Patient Position - Orthostatic VS: Sitting         Visual Acuity      ED Medications  Medications   ibuprofen (MOTRIN) tablet 600 mg (600 mg Oral Given 8/31/24 1912)        Diagnostic Studies  Results Reviewed       Procedure Component Value Units Date/Time    POCT pregnancy, urine [[id number]]  (Normal) Resulted: 08/31/24 1858    Lab Status: Final result Updated: 08/31/24 1858     EXT Preg Test, Ur Negative     Control Valid                   VAS lower limb venous duplex study, unilateral/limited    (Results Pending)              Procedures  Procedures         ED Course                                 SBIRT 20yo+      Flowsheet Row Most Recent Value   Initial Alcohol Screen: US AUDIT-C     1. How often do you have a drink containing alcohol? 0 Filed at: 08/31/2024 1854   2. How many drinks containing alcohol do you have on a typical day you are drinking?  0 Filed at: 08/31/2024 1854   3b. FEMALE Any Age, or MALE 65+: How often do you have 4 or more drinks on one occassion? 0 Filed at: 08/31/2024 1854   Audit-C Score 0 Filed at: 08/31/2024 1854   ROBERTO: How many times in the past year have you...    Used an illegal drug or used a prescription medication for non-medical reasons? Never Filed at: 08/31/2024 1854                      Medical Decision Making  DVT study negative. No trauma to warrant xrays. At this time unclear etiology of her symptoms. Discussed motrin and tylenol at home, ice. Follow up with pcp     I have discussed the plan to discharge pt from ED. The patient was discharged in stable condition.  Patient ambulated off the department.  Extensive return to emergency department precautions were discussed.  Follow up with appropriate providers including primary care physician was discussed.  Patient and/or their  primary decision maker expressed understanding.  Patient remained stable during entire emergency department stay.      Amount and/or Complexity of Data Reviewed  Labs: ordered.    Risk  Prescription drug management.                 Disposition  Final diagnoses:   Leg pain     Time reflects when diagnosis was documented in both MDM as applicable and the  Disposition within this note       Time User Action Codes Description Comment    8/31/2024  7:35 PM Sony Hardy Add [M79.606] Leg pain           ED Disposition       ED Disposition   Discharge    Condition   Stable    Date/Time   Sat Aug 31, 2024 1935    Comment   Donna Farley discharge to home/self care.                   Follow-up Information    None         Patient's Medications   Discharge Prescriptions    No medications on file       No discharge procedures on file.    PDMP Review       None            ED Provider  Electronically Signed by             Sony Hardy PA-C  08/31/24 1950

## 2024-09-01 PROCEDURE — 93971 EXTREMITY STUDY: CPT | Performed by: SURGERY

## 2024-09-02 ENCOUNTER — TELEPHONE (OUTPATIENT)
Dept: OTHER | Facility: OTHER | Age: 24
End: 2024-09-02

## 2024-09-02 NOTE — TELEPHONE ENCOUNTER
Patient would like a call back to schedule an appointment to be seen for her pain in her right arm and left knee.

## 2024-09-04 ENCOUNTER — OFFICE VISIT (OUTPATIENT)
Dept: FAMILY MEDICINE CLINIC | Facility: CLINIC | Age: 24
End: 2024-09-04

## 2024-09-04 VITALS
RESPIRATION RATE: 18 BRPM | TEMPERATURE: 97.7 F | WEIGHT: 157.4 LBS | OXYGEN SATURATION: 99 % | HEART RATE: 87 BPM | BODY MASS INDEX: 27.89 KG/M2 | SYSTOLIC BLOOD PRESSURE: 106 MMHG | DIASTOLIC BLOOD PRESSURE: 54 MMHG | HEIGHT: 63 IN

## 2024-09-04 DIAGNOSIS — M25.561 ACUTE PAIN OF BOTH KNEES: Primary | ICD-10-CM

## 2024-09-04 DIAGNOSIS — M25.562 ACUTE PAIN OF BOTH KNEES: Primary | ICD-10-CM

## 2024-09-04 PROCEDURE — 99213 OFFICE O/P EST LOW 20 MIN: CPT | Performed by: FAMILY MEDICINE

## 2024-09-04 NOTE — PROGRESS NOTES
"Ambulatory Visit  Name: Donna Farley      : 2000      MRN: [id number]  Encounter Provider: Harlan Dyer MD  Encounter Date: 2024   Encounter department: [facility]    Assessment & Plan   1. Acute pain of both knees  -     Ambulatory Referral to Physical Therapy; Future  A few weeks on his left knee pain.  Unremarkable knee exam.  Referral to PT.  Rest/ice/Tylenol/Motrin as needed for pain relief     History of Present Illness     [employer]  ID [id number]  24-year-old female with no significant past medical history presents today with bilateral knee pain.  Symptoms started 2024.  No recent trauma.  Has not taken anything for pain relief other than Tylenol.  Works long hours shifts at work.  Pain is worse with prolonged standing.          Review of Systems   Constitutional:  Negative for appetite change, chills, diaphoresis, fatigue and fever.   HENT:  Negative for congestion, ear pain, hearing loss, rhinorrhea and sinus pain.    Eyes:  Negative for visual disturbance.   Respiratory:  Negative for cough, shortness of breath and wheezing.    Cardiovascular:  Negative for chest pain, palpitations and leg swelling.   Gastrointestinal:  Negative for abdominal pain, blood in stool, constipation, nausea and vomiting.   Endocrine: Negative for polydipsia, polyphagia and polyuria.   Musculoskeletal:  Positive for arthralgias. Negative for back pain and gait problem.   Skin:  Negative for color change.   Neurological:  Negative for numbness and headaches.   Hematological:  Negative for adenopathy.   Psychiatric/Behavioral:  Negative for confusion.        Objective     /54 (BP Location: Left arm, Patient Position: Sitting, Cuff Size: Standard)   Pulse 87   Temp 97.7 °F (36.5 °C) (Temporal)   Resp 18   Ht 5' 3\" (1.6 m)   Wt 71.4 kg (157 lb 6.4 oz)   SpO2 99%   BMI 27.88 kg/m²     Physical Exam  Constitutional:       " General: She is not in acute distress.     Appearance: Normal appearance. She is well-developed. She is not ill-appearing, toxic-appearing or diaphoretic.   HENT:      Head: Normocephalic and atraumatic.      Right Ear: External ear normal.      Left Ear: External ear normal.      Nose: Nose normal.      Mouth/Throat:      Pharynx: No oropharyngeal exudate.   Eyes:      General:         Right eye: No discharge.         Left eye: No discharge.      Pupils: Pupils are equal, round, and reactive to light.   Cardiovascular:      Rate and Rhythm: Normal rate and regular rhythm.      Heart sounds: Normal heart sounds. No murmur heard.     No friction rub. No gallop.   Pulmonary:      Effort: Pulmonary effort is normal. No respiratory distress.      Breath sounds: Normal breath sounds. No stridor. No wheezing.   Abdominal:      General: Bowel sounds are normal.      Palpations: Abdomen is soft. There is no mass.      Tenderness: There is no abdominal tenderness. There is no guarding.   Musculoskeletal:         General: Normal range of motion.      Cervical back: Normal range of motion.      Right knee: No swelling, erythema, ecchymosis, bony tenderness or crepitus. Normal range of motion. No tenderness.      Left knee: No swelling, erythema, ecchymosis, bony tenderness or crepitus. Normal range of motion. No tenderness.   Skin:     General: Skin is warm.      Capillary Refill: Capillary refill takes less than 2 seconds.   Neurological:      Mental Status: She is alert and oriented to person, place, and time.       Administrative Statements

## 2024-09-05 PROBLEM — M25.561 ACUTE PAIN OF BOTH KNEES: Status: ACTIVE | Noted: 2024-09-05

## 2024-09-05 PROBLEM — M25.562 ACUTE PAIN OF BOTH KNEES: Status: ACTIVE | Noted: 2024-09-05

## 2024-10-26 ENCOUNTER — APPOINTMENT (EMERGENCY)
Dept: CT IMAGING | Facility: HOSPITAL | Age: 24
End: 2024-10-26
Payer: COMMERCIAL

## 2024-10-26 ENCOUNTER — HOSPITAL ENCOUNTER (EMERGENCY)
Facility: HOSPITAL | Age: 24
Discharge: HOME | End: 2024-10-26
Payer: COMMERCIAL

## 2024-10-26 VITALS
BODY MASS INDEX: 28.08 KG/M2 | TEMPERATURE: 98.4 F | RESPIRATION RATE: 18 BRPM | WEIGHT: 158.51 LBS | HEART RATE: 92 BPM | DIASTOLIC BLOOD PRESSURE: 65 MMHG | OXYGEN SATURATION: 99 % | SYSTOLIC BLOOD PRESSURE: 118 MMHG

## 2024-10-26 DIAGNOSIS — R51.9 ACUTE NONINTRACTABLE HEADACHE, UNSPECIFIED HEADACHE TYPE: Primary | ICD-10-CM

## 2024-10-26 LAB
ALBUMIN SERPL BCG-MCNC: 4.5 G/DL (ref 3.5–5)
ALP SERPL-CCNC: 69 U/L (ref 34–104)
ALT SERPL W P-5'-P-CCNC: 25 U/L (ref 7–52)
ANION GAP SERPL CALCULATED.3IONS-SCNC: 6 MMOL/L (ref 4–13)
AST SERPL W P-5'-P-CCNC: 17 U/L (ref 13–39)
BASOPHILS # BLD AUTO: 0.05 THOUSANDS/ΜL (ref 0–0.1)
BASOPHILS NFR BLD AUTO: 1 % (ref 0–1)
BILIRUB SERPL-MCNC: 0.41 MG/DL (ref 0.2–1)
BUN SERPL-MCNC: 11 MG/DL (ref 5–25)
CALCIUM SERPL-MCNC: 9.5 MG/DL (ref 8.4–10.2)
CHLORIDE SERPL-SCNC: 105 MMOL/L (ref 96–108)
CO2 SERPL-SCNC: 28 MMOL/L (ref 21–32)
CREAT SERPL-MCNC: 0.71 MG/DL (ref 0.6–1.3)
EOSINOPHIL # BLD AUTO: 0.23 THOUSAND/ΜL (ref 0–0.61)
EOSINOPHIL NFR BLD AUTO: 4 % (ref 0–6)
ERYTHROCYTE [DISTWIDTH] IN BLOOD BY AUTOMATED COUNT: 11.8 % (ref 11.6–15.1)
GFR SERPL CREATININE-BSD FRML MDRD: 118 ML/MIN/1.73SQ M
GLUCOSE SERPL-MCNC: 100 MG/DL (ref 65–140)
HCT VFR BLD AUTO: 41 % (ref 34.8–46.1)
HGB BLD-MCNC: 13.4 G/DL (ref 11.5–15.4)
IMM GRANULOCYTES # BLD AUTO: 0.02 THOUSAND/UL (ref 0–0.2)
IMM GRANULOCYTES NFR BLD AUTO: 0 % (ref 0–2)
LYMPHOCYTES # BLD AUTO: 1.66 THOUSANDS/ΜL (ref 0.6–4.47)
LYMPHOCYTES NFR BLD AUTO: 26 % (ref 14–44)
MCH RBC QN AUTO: 31.8 PG (ref 26.8–34.3)
MCHC RBC AUTO-ENTMCNC: 32.7 G/DL (ref 31.4–37.4)
MCV RBC AUTO: 97 FL (ref 82–98)
MONOCYTES # BLD AUTO: 0.89 THOUSAND/ΜL (ref 0.17–1.22)
MONOCYTES NFR BLD AUTO: 14 % (ref 4–12)
NEUTROPHILS # BLD AUTO: 3.54 THOUSANDS/ΜL (ref 1.85–7.62)
NEUTS SEG NFR BLD AUTO: 55 % (ref 43–75)
NRBC BLD AUTO-RTO: 0 /100 WBCS
PLATELET # BLD AUTO: 243 THOUSANDS/UL (ref 149–390)
PMV BLD AUTO: 9.8 FL (ref 8.9–12.7)
POTASSIUM SERPL-SCNC: 4.2 MMOL/L (ref 3.5–5.3)
PROT SERPL-MCNC: 7.7 G/DL (ref 6.4–8.4)
RBC # BLD AUTO: 4.22 MILLION/UL (ref 3.81–5.12)
SODIUM SERPL-SCNC: 139 MMOL/L (ref 135–147)
WBC # BLD AUTO: 6.39 THOUSAND/UL (ref 4.31–10.16)

## 2024-10-26 PROCEDURE — 96375 TX/PRO/DX INJ NEW DRUG ADDON: CPT

## 2024-10-26 PROCEDURE — 96365 THER/PROPH/DIAG IV INF INIT: CPT

## 2024-10-26 PROCEDURE — 80053 COMPREHEN METABOLIC PANEL: CPT | Performed by: PHYSICIAN ASSISTANT

## 2024-10-26 PROCEDURE — 36415 COLL VENOUS BLD VENIPUNCTURE: CPT | Performed by: PHYSICIAN ASSISTANT

## 2024-10-26 PROCEDURE — 70450 CT HEAD/BRAIN W/O DYE: CPT

## 2024-10-26 PROCEDURE — 99285 EMERGENCY DEPT VISIT HI MDM: CPT | Performed by: PHYSICIAN ASSISTANT

## 2024-10-26 PROCEDURE — 99284 EMERGENCY DEPT VISIT MOD MDM: CPT

## 2024-10-26 PROCEDURE — 85025 COMPLETE CBC W/AUTO DIFF WBC: CPT | Performed by: PHYSICIAN ASSISTANT

## 2024-10-26 RX ORDER — DIPHENHYDRAMINE HCL 25 MG
25 TABLET ORAL EVERY 6 HOURS PRN
Qty: 30 TABLET | Refills: 0 | Status: SHIPPED | OUTPATIENT
Start: 2024-10-26

## 2024-10-26 RX ORDER — ACETAMINOPHEN 500 MG
500 TABLET ORAL EVERY 6 HOURS PRN
Qty: 30 TABLET | Refills: 0 | Status: SHIPPED | OUTPATIENT
Start: 2024-10-26 | End: 2024-10-26 | Stop reason: CLARIF

## 2024-10-26 RX ORDER — DIPHENHYDRAMINE HYDROCHLORIDE 50 MG/ML
25 INJECTION INTRAMUSCULAR; INTRAVENOUS ONCE
Status: COMPLETED | OUTPATIENT
Start: 2024-10-26 | End: 2024-10-26

## 2024-10-26 RX ORDER — DEXAMETHASONE SODIUM PHOSPHATE 10 MG/ML
10 INJECTION, SOLUTION INTRAMUSCULAR; INTRAVENOUS ONCE
Status: COMPLETED | OUTPATIENT
Start: 2024-10-26 | End: 2024-10-26

## 2024-10-26 RX ORDER — MAGNESIUM SULFATE HEPTAHYDRATE 40 MG/ML
2 INJECTION, SOLUTION INTRAVENOUS ONCE
Status: COMPLETED | OUTPATIENT
Start: 2024-10-26 | End: 2024-10-26

## 2024-10-26 RX ORDER — NAPROXEN 500 MG/1
500 TABLET ORAL 2 TIMES DAILY WITH MEALS
Qty: 20 TABLET | Refills: 0 | Status: SHIPPED | OUTPATIENT
Start: 2024-10-26 | End: 2024-10-26 | Stop reason: CLARIF

## 2024-10-26 RX ORDER — METOCLOPRAMIDE HYDROCHLORIDE 5 MG/ML
10 INJECTION INTRAMUSCULAR; INTRAVENOUS ONCE
Status: COMPLETED | OUTPATIENT
Start: 2024-10-26 | End: 2024-10-26

## 2024-10-26 RX ORDER — ACETAMINOPHEN 500 MG
500 TABLET ORAL EVERY 6 HOURS PRN
Qty: 30 TABLET | Refills: 0 | Status: SHIPPED | OUTPATIENT
Start: 2024-10-26

## 2024-10-26 RX ORDER — METOCLOPRAMIDE 10 MG/1
10 TABLET ORAL 4 TIMES DAILY PRN
Qty: 12 TABLET | Refills: 0 | Status: SHIPPED | OUTPATIENT
Start: 2024-10-26 | End: 2024-10-29

## 2024-10-26 RX ORDER — NAPROXEN 500 MG/1
500 TABLET ORAL 2 TIMES DAILY WITH MEALS
Qty: 20 TABLET | Refills: 0 | Status: SHIPPED | OUTPATIENT
Start: 2024-10-26 | End: 2025-10-26

## 2024-10-26 RX ORDER — METOCLOPRAMIDE 10 MG/1
10 TABLET ORAL EVERY 6 HOURS
Qty: 30 TABLET | Refills: 0 | Status: SHIPPED | OUTPATIENT
Start: 2024-10-26 | End: 2024-10-26 | Stop reason: CLARIF

## 2024-10-26 RX ADMIN — DEXAMETHASONE SODIUM PHOSPHATE 10 MG: 10 INJECTION, SOLUTION INTRAMUSCULAR; INTRAVENOUS at 12:28

## 2024-10-26 RX ADMIN — DIPHENHYDRAMINE HYDROCHLORIDE 25 MG: 50 INJECTION, SOLUTION INTRAMUSCULAR; INTRAVENOUS at 12:31

## 2024-10-26 RX ADMIN — MAGNESIUM SULFATE HEPTAHYDRATE 2 G: 2 INJECTION, SOLUTION INTRAVENOUS at 12:50

## 2024-10-26 RX ADMIN — SODIUM CHLORIDE 1000 ML: 0.9 INJECTION, SOLUTION INTRAVENOUS at 12:40

## 2024-10-26 RX ADMIN — METOCLOPRAMIDE 10 MG: 5 INJECTION, SOLUTION INTRAMUSCULAR; INTRAVENOUS at 12:33

## 2024-10-26 NOTE — Clinical Note
Donna Farley was seen and treated in our emergency department on 10/26/2024.                Diagnosis:     Donna  may return to work on return date.    She may return on this date: 10/28/2024         If you have any questions or concerns, please don't hesitate to call.      Sarah Beth Hudson PA-C    ______________________________           _______________          _______________  Hospital Representative                              Date                                Time

## 2024-10-26 NOTE — ED PROVIDER NOTES
"Time reflects when diagnosis was documented in both MDM as applicable and the Disposition within this note       Time User Action Codes Description Comment    10/26/2024  1:41 PM Tristan Sarah Beth Add [M25.561,  M25.562] Acute pain of both knees     10/26/2024  1:41 PM WalkerIrmaSarah Beth Remove [M25.561,  M25.562] Acute pain of both knees     10/26/2024  1:42 PM Sarah Beth Hudson Add [R51.9] Acute nonintractable headache, unspecified headache type           ED Disposition       ED Disposition   Discharge    Condition   Stable    Date/Time   Sat Oct 26, 2024  1:40 PM    Comment   Donna Farley discharge to home/self care.                   Assessment & Plan       Medical Decision Making  24-year-old female presenting for evaluation of a headache which she reports began 3 days ago she reports a history of headaches without a diagnosis and reports that she was told \"may be has migraines\" she reports she does not take any medications specifically for migraines.  She denies head trauma, concerning features for vascular cause for symptoms such as CVA, TIA, thrombus.  Neurologic exam is reassuring patient reports numbness to nursing staff however reports to this provider \"I feel as though my face does not work\" facial weakness, sensory deficits or abnormalities are not found on exam, workup to include blood work to identify potential acute anemia versus leukocytosis versus hemoconcentration to suggest dehydration, metabolic disturbance, organ dysfunction.  CT of the head obtained in the unlikely event of a potential subarachnoid hemorrhage or intracranial abnormality.  Migraine cocktail administered with dexamethasone as Toradol is contraindicated until CT imaging returns demonstrating no bleed.    Workup demonstrates - no acute abnormalities, migraine cocktail effective at treating pain and patient advised to follow up with family care provider / neurologist.     All imaging and/or lab testing discussed with " "patient, strict return to ED precautions discussed. Patient and/or family members verbalizes understanding and agrees with plan. Patient is stable for discharge     Portions of the record may have been created with voice recognition software. Occasional wrong word or \"sound a like\" substitutions may have occurred due to the inherent limitations of voice recognition software. Read the chart carefully and recognize, using context, where substitutions have occurred.    Amount and/or Complexity of Data Reviewed  Labs: ordered.  Radiology: ordered.    Risk  Prescription drug management.        ED Course as of 10/26/24 1348   Sat Oct 26, 2024   1337 Patient was reexamined at this time and informed of laboratory and/or imaging results and was found to be stable for discharge.  Return to emergency department criteria was reviewed with the patient who verbalized understanding and was agreeable to discharge and the treatment plan at this time.           Medications   metoclopramide (REGLAN) injection 10 mg (10 mg Intravenous Given 10/26/24 1233)   diphenhydrAMINE (BENADRYL) injection 25 mg (25 mg Intravenous Given 10/26/24 1231)   sodium chloride 0.9 % bolus 1,000 mL (0 mL Intravenous Stopped 10/26/24 1340)   dexamethasone (PF) (DECADRON) injection 10 mg (10 mg Intravenous Given 10/26/24 1228)   magnesium sulfate 2 g/50 mL IVPB (premix) 2 g (0 g Intravenous Stopped 10/26/24 1340)       ED Risk Strat Scores                           SBIRT 22yo+      Flowsheet Row Most Recent Value   Initial Alcohol Screen: US AUDIT-C     1. How often do you have a drink containing alcohol? 0 Filed at: 10/26/2024 1150   2. How many drinks containing alcohol do you have on a typical day you are drinking?  0 Filed at: 10/26/2024 1150   3a. Male UNDER 65: How often do you have five or more drinks on one occasion? 0 Filed at: 10/26/2024 1150   3b. FEMALE Any Age, or MALE 65+: How often do you have 4 or more drinks on one occassion? 0 Filed at: " "10/26/2024 1150   Audit-C Score 0 Filed at: 10/26/2024 1150   ROBERTO: How many times in the past year have you...    Used an illegal drug or used a prescription medication for non-medical reasons? Never Filed at: 10/26/2024 1150                            History of Present Illness       Chief Complaint   Patient presents with    Headache     Reports pain on R side of head behind ear x2 days that migrated to L temporal lobe; Reports vomiting and dizziness since yesterday and swelling in temples. Took Tylenol last night at 7pm with no relief. Pt added that she also took aspirin yesterday because \"the left side of my face got numb.\"       Past Medical History:   Diagnosis Date    Migraines       Past Surgical History:   Procedure Laterality Date    NO PAST SURGERIES        Family History   Problem Relation Age of Onset    No Known Problems Mother     No Known Problems Father     No Known Problems Sister     No Known Problems Brother     Diabetes Maternal Grandmother     Hyperlipidemia Maternal Grandmother     No Known Problems Maternal Grandfather     Heart disease Paternal Grandmother     Asthma Paternal Grandmother     Cancer Neg Hx     Breast cancer Neg Hx     Colon cancer Neg Hx     Ovarian cancer Neg Hx       Social History     Tobacco Use    Smoking status: Never    Smokeless tobacco: Never   Vaping Use    Vaping status: Never Used   Substance Use Topics    Alcohol use: Never    Drug use: Never      E-Cigarette/Vaping    E-Cigarette Use Never User       E-Cigarette/Vaping Substances    Nicotine No     THC No     CBD No     Flavoring No     Other No     Unknown No       I have reviewed and agree with the history as documented.     24-year-old female reports history of headaches states she has been told in the past she \"might have migraines\" states she has not seen a neurologist and does not take any medication specifically for migraines and reports she is not officially diagnosed with migraines presents today for " "evaluation of 3 days of a headache which she reports is bilateral in nature wrapping in a band around her head around the temples she denies fevers, chills, neck stiffness, head trauma, vision changes/vision loss, syncope, near-syncope, chest pain, palpitations, dyspnea, abdominal pain.  She reports she has been taking Tylenol without relief for symptoms.  She endorses nausea and photophobia.  She reports to nursing staff that she has facial numbness however she reports this provider sensation is intact and she feels as though her \"face does not work\"       Headache  Associated symptoms: no abdominal pain, no congestion, no dizziness, no ear pain, no fatigue, no fever, no nausea, no numbness, no sore throat and no vomiting        Review of Systems   Constitutional:  Negative for chills, fatigue and fever.   HENT:  Negative for congestion, ear pain, rhinorrhea and sore throat.    Eyes:  Negative for redness.   Respiratory:  Negative for chest tightness and shortness of breath.    Cardiovascular:  Negative for chest pain and palpitations.   Gastrointestinal:  Negative for abdominal pain, nausea and vomiting.   Genitourinary:  Negative for dysuria and hematuria.   Musculoskeletal: Negative.    Skin:  Negative for rash.   Neurological:  Positive for headaches. Negative for dizziness, syncope, light-headedness and numbness.           Objective       ED Triage Vitals   Temperature Pulse Blood Pressure Respirations SpO2 Patient Position - Orthostatic VS   10/26/24 1147 10/26/24 1147 10/26/24 1147 10/26/24 1147 10/26/24 1147 10/26/24 1147   98.4 °F (36.9 °C) 85 123/74 18 98 % Sitting      Temp Source Heart Rate Source BP Location FiO2 (%) Pain Score    10/26/24 1147 10/26/24 1147 10/26/24 1147 -- 10/26/24 1154    Oral Monitor Right arm  10 - Worst Possible Pain      Vitals      Date and Time Temp Pulse SpO2 Resp BP Pain Score FACES Pain Rating User   10/26/24 1345 -- 92 99 % 18 118/65 -- -- JN   10/26/24 1154 -- -- -- -- -- " 10 - Worst Possible Pain Frontal HA -- LD   10/26/24 1147 98.4 °F (36.9 °C) 85 98 % 18 123/74 -- -- FK            Physical Exam  Vitals and nursing note reviewed.   Constitutional:       Appearance: She is well-developed.   HENT:      Head: Normocephalic.   Eyes:      General: No scleral icterus.  Cardiovascular:      Rate and Rhythm: Normal rate and regular rhythm.   Pulmonary:      Effort: Pulmonary effort is normal.      Breath sounds: Normal breath sounds. No stridor.   Abdominal:      General: There is no distension.      Palpations: Abdomen is soft.      Tenderness: There is no abdominal tenderness.   Musculoskeletal:         General: Normal range of motion.   Skin:     General: Skin is warm and dry.      Capillary Refill: Capillary refill takes less than 2 seconds.   Neurological:      Mental Status: She is alert and oriented to person, place, and time.         Results Reviewed       Procedure Component Value Units Date/Time    Comprehensive metabolic panel [[id number]] Collected: 10/26/24 1226    Lab Status: Final result Specimen: Blood from Arm, Right Updated: 10/26/24 1300     Sodium 139 mmol/L      Potassium 4.2 mmol/L      Chloride 105 mmol/L      CO2 28 mmol/L      ANION GAP 6 mmol/L      BUN 11 mg/dL      Creatinine 0.71 mg/dL      Glucose 100 mg/dL      Calcium 9.5 mg/dL      AST 17 U/L      ALT 25 U/L      Alkaline Phosphatase 69 U/L      Total Protein 7.7 g/dL      Albumin 4.5 g/dL      Total Bilirubin 0.41 mg/dL      eGFR 118 ml/min/1.73sq m     Narrative:      National Kid[employer] guidelines for Chronic Kidney Disease (CKD):     Stage 1 with normal or high GFR (GFR > 90 mL/min/1.73 square meters)    Stage 2 Mild CKD (GFR = 60-89 mL/min/1.73 square meters)    Stage 3A Moderate CKD (GFR = 45-59 mL/min/1.73 square meters)    Stage 3B Moderate CKD (GFR = 30-44 mL/min/1.73 square meters)    Stage 4 Severe CKD (GFR = 15-29 mL/min/1.73 square meters)    Stage 5 End Stage CKD (GFR <15  mL/min/1.73 square meters)  Note: GFR calculation is accurate only with a steady state creatinine    CBC and differential [[id number]]  (Abnormal) Collected: 10/26/24 1226    Lab Status: Final result Specimen: Blood from Arm, Right Updated: 10/26/24 1243     WBC 6.39 Thousand/uL      RBC 4.22 Million/uL      Hemoglobin 13.4 g/dL      Hematocrit 41.0 %      MCV 97 fL      MCH 31.8 pg      MCHC 32.7 g/dL      RDW 11.8 %      MPV 9.8 fL      Platelets 243 Thousands/uL      nRBC 0 /100 WBCs      Segmented % 55 %      Immature Grans % 0 %      Lymphocytes % 26 %      Monocytes % 14 %      Eosinophils Relative 4 %      Basophils Relative 1 %      Absolute Neutrophils 3.54 Thousands/µL      Absolute Immature Grans 0.02 Thousand/uL      Absolute Lymphocytes 1.66 Thousands/µL      Absolute Monocytes 0.89 Thousand/µL      Eosinophils Absolute 0.23 Thousand/µL      Basophils Absolute 0.05 Thousands/µL     POCT pregnancy, urine [[id number]]     Lab Status: No result             CT head without contrast   Final Interpretation by Raymundo Hernandez MD (10/26 133)      No acute intracranial abnormality.                  Workstation performed: [id number]             Procedures    ED Medication and Procedure Management   Prior to Admission Medications   Prescriptions Last Dose Informant Patient Reported? Taking?   SUMAtriptan (IMITREX) 100 mg tablet   No No   Sig: Take 1 tablet (100 mg total) by mouth once as needed for migraine for up to 2 doses may repeat in 2 hours if necessary. Max dose 200mg in 24 hour period.   etonogestrel (NEXPLANON) subdermal implant   Yes No   Si mg by Subdermal route Once every 3 years   ibuprofen (MOTRIN) 600 mg tablet   No No   Sig: Take 1 tablet (600 mg total) by mouth every 6 (six) hours as needed for headaches      Facility-Administered Medications: None     Patient's Medications   Discharge Prescriptions    ACETAMINOPHEN (TYLENOL) 500 MG TABLET    Take 1 tablet (500 mg total) by mouth every 6 (six)  hours as needed for mild pain       Start Date: 10/26/2024End Date: --       Order Dose: 500 mg       Quantity: 30 tablet    Refills: 0    DIPHENHYDRAMINE (BENADRYL) 25 MG TABLET    Take 1 tablet (25 mg total) by mouth every 6 (six) hours as needed for itching       Start Date: 10/26/2024End Date: --       Order Dose: 25 mg       Quantity: 30 tablet    Refills: 0    METOCLOPRAMIDE (REGLAN) 10 MG TABLET    Take 1 tablet (10 mg total) by mouth 4 (four) times a day as needed (headache) for up to 3 days       Start Date: 10/26/2024End Date: 10/29/2024       Order Dose: 10 mg       Quantity: 12 tablet    Refills: 0    NAPROXEN (EC NAPROSYN) 500 MG EC TABLET    Take 1 tablet (500 mg total) by mouth 2 (two) times a day with meals       Start Date: 10/26/2024End Date: 10/26/2025       Order Dose: 500 mg       Quantity: 20 tablet    Refills: 0     No discharge procedures on file.  ED SEPSIS DOCUMENTATION   Time reflects when diagnosis was documented in both MDM as applicable and the Disposition within this note       Time User Action Codes Description Comment    10/26/2024  1:41 PM Sarah Beth Hudson [M25.561,  M25.562] Acute pain of both knees     10/26/2024  1:41 PM Sarah Beth Hudson Remove [M25.561,  M25.562] Acute pain of both knees     10/26/2024  1:42 PM Sarah Beth Hudson [R51.9] Acute nonintractable headache, unspecified headache type                  Sarah Beth Hudson PA-C  10/26/24 5815

## 2024-11-11 ENCOUNTER — OFFICE VISIT (OUTPATIENT)
Dept: OBGYN CLINIC | Facility: CLINIC | Age: 24
End: 2024-11-11

## 2024-11-11 VITALS
DIASTOLIC BLOOD PRESSURE: 74 MMHG | HEART RATE: 76 BPM | WEIGHT: 158 LBS | SYSTOLIC BLOOD PRESSURE: 109 MMHG | BODY MASS INDEX: 27.99 KG/M2

## 2024-11-11 DIAGNOSIS — G89.29 CHRONIC NONINTRACTABLE HEADACHE, UNSPECIFIED HEADACHE TYPE: Primary | ICD-10-CM

## 2024-11-11 DIAGNOSIS — Z30.09 UNWANTED FERTILITY: ICD-10-CM

## 2024-11-11 DIAGNOSIS — R51.9 CHRONIC NONINTRACTABLE HEADACHE, UNSPECIFIED HEADACHE TYPE: Primary | ICD-10-CM

## 2024-11-11 DIAGNOSIS — Z23 NEED FOR HPV VACCINATION: ICD-10-CM

## 2024-11-11 PROCEDURE — 99213 OFFICE O/P EST LOW 20 MIN: CPT | Performed by: NURSE PRACTITIONER

## 2024-11-11 PROCEDURE — 90651 9VHPV VACCINE 2/3 DOSE IM: CPT | Performed by: NURSE PRACTITIONER

## 2024-11-11 PROCEDURE — 90471 IMMUNIZATION ADMIN: CPT | Performed by: NURSE PRACTITIONER

## 2024-11-11 RX ORDER — NORGESTIMATE AND ETHINYL ESTRADIOL 0.25-0.035
1 KIT ORAL DAILY
Qty: 28 TABLET | Refills: 3 | Status: SHIPPED | OUTPATIENT
Start: 2024-11-11

## 2024-11-11 NOTE — PROGRESS NOTES
PROBLEM GYNECOLOGICAL VISIT    Donna Farley is a 24 y.o. female who presents today with complaint of headaches.  Her general medical history has been reviewed and she reports it as follows:    Past Medical History:   Diagnosis Date    Migraines      Past Surgical History:   Procedure Laterality Date    NO PAST SURGERIES       OB History          1    Para   1    Term   1       0    AB   0    Living   1         SAB   0    IAB   0    Ectopic   0    Multiple   0    Live Births   1               Social History     Tobacco Use    Smoking status: Never    Smokeless tobacco: Never   Vaping Use    Vaping status: Never Used   Substance Use Topics    Alcohol use: Never    Drug use: Never     Social History     Substance and Sexual Activity   Sexual Activity Yes    Partners: Male    Birth control/protection: Implant       Current Outpatient Medications   Medication Instructions    acetaminophen (TYLENOL) 500 mg, Oral, Every 6 hours PRN    diphenhydrAMINE (BENADRYL) 25 mg, Oral, Every 6 hours PRN    etonogestrel (NEXPLANON) 68 mg, Subdermal, Once every 3 years -  Implant and IUD    ibuprofen (MOTRIN) 600 mg, Oral, Every 6 hours PRN    naproxen (EC NAPROSYN) 500 mg, Oral, 2 times daily with meals    norgestimate-ethinyl estradiol (Sprintec 28) 0.25-35 MG-MCG per tablet 1 tablet, Oral, Daily    SUMAtriptan (IMITREX) 100 mg, Oral, Once as needed, may repeat in 2 hours if necessary. Max dose 200mg in 24 hour period.       History of Present Illness:   Reports that for the past 5 months she has been experiencing severe headaches which tend to occur 1-2x/week.  She has been prescribed prescription medications for such but she believes that her Nexplanon may be the etiology of the headaches.  She desires to have the Nexplanon removed and switch to OCP's for contraception instead.  She has used OCP's in the past without issue.    Review of Systems:  Review of Systems   Constitutional: Negative.    Genitourinary:  Negative.    Neurological:  Positive for headaches.       Physical Exam:  /74 (BP Location: Right arm, Patient Position: Sitting, Cuff Size: Standard)   Pulse 76   Wt 71.7 kg (158 lb)   LMP 03/01/2024 (Approximate)   BMI 27.99 kg/m²   Physical Exam  Constitutional:       General: She is not in acute distress.  Neurological:      Mental Status: She is alert.   Skin:     General: Skin is warm and dry.   Vitals reviewed.       Assessment:   1. Unwanted fertility.   2. Headaches.    Plan:   1. Reviewed with patient that Nexplanon is unlikely to be the etiology of her headaches as she has been using it since 5/2023 and headaches only became an issue about 5 months ago.  However, I am happy to remove the Nexplanon and switch her to OCP's at her request.   2. Given Rx OCP's and instructed on appropriate administration.  Advised to start OCP's today.   3. Return to office in 2-3 weeks for removal of Nexplanon..   4. Patient's depression screening was assessed with a PHQ-2 score of 0. Clinically patient does not have depression. No treatment is required.

## 2024-11-11 NOTE — PATIENT INSTRUCTIONS
Huseyin por pineda confianza en nuestro equipo.   Le agradecemos y agradecemos rolando comentarios.   Si recibe trinh encuesta nuestra, tómese unos momentos para informarnos cómo estamos.   Sinceramente,  ALEJANDRO Patel

## 2024-12-04 ENCOUNTER — PROCEDURE VISIT (OUTPATIENT)
Dept: OBGYN CLINIC | Facility: CLINIC | Age: 24
End: 2024-12-04

## 2024-12-04 VITALS
WEIGHT: 157.6 LBS | BODY MASS INDEX: 27.93 KG/M2 | DIASTOLIC BLOOD PRESSURE: 76 MMHG | HEART RATE: 83 BPM | SYSTOLIC BLOOD PRESSURE: 119 MMHG | HEIGHT: 63 IN

## 2024-12-04 DIAGNOSIS — Z30.46 ENCOUNTER FOR NEXPLANON REMOVAL: Primary | ICD-10-CM

## 2024-12-04 PROCEDURE — 11982 REMOVE DRUG IMPLANT DEVICE: CPT | Performed by: NURSE PRACTITIONER

## 2024-12-04 RX ORDER — LIDOCAINE HYDROCHLORIDE AND EPINEPHRINE BITARTRATE 20; .01 MG/ML; MG/ML
1 INJECTION, SOLUTION SUBCUTANEOUS ONCE
Status: COMPLETED | OUTPATIENT
Start: 2024-12-04 | End: 2024-12-04

## 2024-12-04 RX ADMIN — LIDOCAINE HYDROCHLORIDE AND EPINEPHRINE BITARTRATE 1 ML: 20; .01 INJECTION, SOLUTION SUBCUTANEOUS at 15:42

## 2024-12-04 NOTE — PROGRESS NOTES
"Universal Protocol:  procedure performed by consultantConsent: Verbal consent obtained. Written consent obtained.  Risks and benefits: risks, benefits and alternatives were discussed  Consent given by: patient  Time out: Immediately prior to procedure a \"time out\" was called to verify the correct patient, procedure, equipment, support staff and site/side marked as required.  Patient understanding: patient states understanding of the procedure being performed  Patient consent: the patient's understanding of the procedure matches consent given  Procedure consent: procedure consent matches procedure scheduled  Required items: required blood products, implants, devices, and special equipment available  Patient identity confirmed: verbally with patient  Remove and insert drug implant    Date/Time: 2024 2:30 PM    Performed by: ALEJANDRO Patel  Authorized by: ALEJANDRO Patel    Indication:     Indication: Presence of non-biodegradable drug delivery implant    Pre-procedure:     Pre-procedure timeout performed: yes      Prepped with: alcohol 70% and povidone-iodine      Local anesthetic:  Lidocaine with epinephrine    The site was cleaned and prepped in a sterile fashion: yes    Procedure:     Procedure:  Removal    Small stab incision was made in arm: yes (x2)      Left/right:  Left    Site was closed with steri-strips and pressure bandage applied: yes    Comments:      Patient has two  Nexplanon devices in her L upper arm.  Two small stab incisions were required to remove them both.      "

## 2025-02-04 ENCOUNTER — OFFICE VISIT (OUTPATIENT)
Dept: OBGYN CLINIC | Facility: CLINIC | Age: 25
End: 2025-02-04

## 2025-02-04 VITALS — BODY MASS INDEX: 28.02 KG/M2 | SYSTOLIC BLOOD PRESSURE: 108 MMHG | DIASTOLIC BLOOD PRESSURE: 80 MMHG | WEIGHT: 158.2 LBS

## 2025-02-04 DIAGNOSIS — Z30.41 ENCOUNTER FOR SURVEILLANCE OF CONTRACEPTIVE PILLS: Primary | ICD-10-CM

## 2025-02-04 DIAGNOSIS — Z30.09 UNWANTED FERTILITY: ICD-10-CM

## 2025-02-04 PROCEDURE — 99213 OFFICE O/P EST LOW 20 MIN: CPT | Performed by: NURSE PRACTITIONER

## 2025-02-04 RX ORDER — NORGESTIMATE AND ETHINYL ESTRADIOL 0.25-0.035
1 KIT ORAL DAILY
Qty: 28 TABLET | Refills: 3 | Status: SHIPPED | OUTPATIENT
Start: 2025-02-04

## 2025-02-04 NOTE — PROGRESS NOTES
Donna STOUT Miguelito presents today for OCP surveillance visit.  She reports doing well with OCP's and denies any issues.  She desires to continue with them.    /80 (BP Location: Left arm, Patient Position: Sitting, Cuff Size: Standard)   Wt 71.8 kg (158 lb 3.2 oz)   BMI 28.02 kg/m²     Given Rx refills.  Return in 5/2025 as previously scheduled for annual GYN exam.

## 2025-02-23 ENCOUNTER — HOSPITAL ENCOUNTER (EMERGENCY)
Facility: HOSPITAL | Age: 25
Discharge: HOME | End: 2025-02-23
Payer: COMMERCIAL

## 2025-02-23 VITALS
DIASTOLIC BLOOD PRESSURE: 76 MMHG | WEIGHT: 157.41 LBS | OXYGEN SATURATION: 98 % | RESPIRATION RATE: 18 BRPM | BODY MASS INDEX: 27.88 KG/M2 | SYSTOLIC BLOOD PRESSURE: 127 MMHG | HEART RATE: 126 BPM | TEMPERATURE: 98.4 F
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
## 2025-02-23 DIAGNOSIS — R21 RASH: ICD-10-CM

## 2025-02-23 PROCEDURE — 99282 EMERGENCY DEPT VISIT SF MDM: CPT

## 2025-02-23 PROCEDURE — 99284 EMERGENCY DEPT VISIT MOD MDM: CPT

## 2025-02-23 RX ORDER — TRIAMCINOLONE ACETONIDE 1 MG/G
CREAM TOPICAL 2 TIMES DAILY
Qty: 28.4 G | Refills: 0 | Status: SHIPPED | OUTPATIENT
Start: 2025-02-23

## 2025-02-23 RX ORDER — TRIAMCINOLONE ACETONIDE 1 MG/G
CREAM TOPICAL 2 TIMES DAILY
Status: DISCONTINUED | OUTPATIENT
Start: 2025-02-23 | End: 2025-02-23 | Stop reason: HOSPADM

## 2025-02-23 RX ADMIN — TRIAMCINOLONE ACETONIDE: 1 CREAM TOPICAL at 09:50

## 2025-02-23 NOTE — ED PROVIDER NOTES
Time reflects when diagnosis was documented in both MDM as applicable and the Disposition within this note       Time User Action Codes Description Comment    2/23/2025  9:42 AM YomairaitisAiden Add [W57.XXXA] Bug bite, initial encounter     2/23/2025  9:42 AM Yogregorioubaitis Aiden Add [R21] Rash           ED Disposition       ED Disposition   Discharge    Condition   Stable    Date/Time   Sun Feb 23, 2025  9:42 AM    Comment   Donna Farley discharge to home/self care.                   Assessment & Plan       Medical Decision Making  24F presenting to the ED due to itchy rash. There are a few scattered macules that appear to be from bites. Discussed evaluation with patient and recommendation to evaluate home for bugs. Offered symptomatic treatment which she is agreeable with. Discharged with recommendations to f/u w/ pcp for reevaluation in 3-5 days.     Risk  Prescription drug management.             Medications - No data to display    ED Risk Strat Scores                                                History of Present Illness       Chief Complaint   Patient presents with    Rash     Pt with generalized redness on arms and legs. States she has had this for 2 weeks. Taking benadryl at home.        Past Medical History:   Diagnosis Date    Migraines       Past Surgical History:   Procedure Laterality Date    NO PAST SURGERIES        Family History   Problem Relation Age of Onset    No Known Problems Mother     No Known Problems Father     No Known Problems Sister     No Known Problems Brother     Diabetes Maternal Grandmother     Hyperlipidemia Maternal Grandmother     No Known Problems Maternal Grandfather     Heart disease Paternal Grandmother     Asthma Paternal Grandmother     Cancer Neg Hx     Breast cancer Neg Hx     Colon cancer Neg Hx     Ovarian cancer Neg Hx       Social History     Tobacco Use    Smoking status: Never    Smokeless tobacco: Never   Vaping Use    Vaping status: Never Used   Substance  Use Topics    Alcohol use: Never    Drug use: Never      E-Cigarette/Vaping    E-Cigarette Use Never User       E-Cigarette/Vaping Substances    Nicotine No     THC No     CBD No     Flavoring No     Other No     Unknown No       I have reviewed and agree with the history as documented.     24F presentingt o the Ed due to multiple areas of rash on her back and bilateral arms. First noticed them about 2 weeks ago. They are itchy and occasionally burn. No one else she knows sof has similar rash. Denies that there is possibility of bugs/insects in the home that caused them. Took benadryl with minimal relief. No systemic symptoms or other complaints.         Review of Systems   All other systems reviewed and are negative.          Objective       ED Triage Vitals [02/23/25 0921]   Temperature Pulse Blood Pressure Respirations SpO2 Patient Position - Orthostatic VS   98.4 °F (36.9 °C) (!) 126 127/76 18 98 % Sitting      Temp Source Heart Rate Source BP Location FiO2 (%) Pain Score    Oral Monitor Left arm -- --      Vitals      Date and Time Temp Pulse SpO2 Resp BP Pain Score FACES Pain Rating User   02/23/25 0921 98.4 °F (36.9 °C) 126 98 % 18 127/76 -- -- LO            Physical Exam  Constitutional:       General: She is not in acute distress.     Appearance: Normal appearance. She is not ill-appearing or toxic-appearing.   HENT:      Head: Normocephalic and atraumatic.      Mouth/Throat:      Mouth: Mucous membranes are moist.   Eyes:      Extraocular Movements: Extraocular movements intact.   Pulmonary:      Effort: Pulmonary effort is normal.   Abdominal:      Palpations: Abdomen is soft.   Skin:     General: Skin is warm.      Comments: Scattered urticaria with surrounding erythema on bilateral wrists and back.    Neurological:      Mental Status: She is alert.   Psychiatric:         Mood and Affect: Mood normal.         Results Reviewed       None            No orders to display       Procedures    ED Medication  and Procedure Management   Prior to Admission Medications   Prescriptions Last Dose Informant Patient Reported? Taking?   SUMAtriptan (IMITREX) 100 mg tablet   No No   Sig: Take 1 tablet (100 mg total) by mouth once as needed for migraine for up to 2 doses may repeat in 2 hours if necessary. Max dose 200mg in 24 hour period.   acetaminophen (TYLENOL) 500 mg tablet   No No   Sig: Take 1 tablet (500 mg total) by mouth every 6 (six) hours as needed for mild pain   diphenhydrAMINE (BENADRYL) 25 mg tablet   No No   Sig: Take 1 tablet (25 mg total) by mouth every 6 (six) hours as needed for itching   Patient not taking: Reported on 2/4/2025   ibuprofen (MOTRIN) 600 mg tablet   No No   Sig: Take 1 tablet (600 mg total) by mouth every 6 (six) hours as needed for headaches   naproxen (EC NAPROSYN) 500 MG EC tablet   No No   Sig: Take 1 tablet (500 mg total) by mouth 2 (two) times a day with meals   norgestimate-ethinyl estradiol (Sprintec 28) 0.25-35 MG-MCG per tablet   No No   Sig: Take 1 tablet by mouth daily      Facility-Administered Medications: None     Discharge Medication List as of 2/23/2025  9:43 AM        START taking these medications    Details   triamcinolone (KENALOG) 0.1 % cream Apply topically 2 (two) times a day, Starting Sun 2/23/2025, Normal           CONTINUE these medications which have NOT CHANGED    Details   acetaminophen (TYLENOL) 500 mg tablet Take 1 tablet (500 mg total) by mouth every 6 (six) hours as needed for mild pain, Starting Sat 10/26/2024, Normal      diphenhydrAMINE (BENADRYL) 25 mg tablet Take 1 tablet (25 mg total) by mouth every 6 (six) hours as needed for itching, Starting Sat 10/26/2024, Normal      ibuprofen (MOTRIN) 600 mg tablet Take 1 tablet (600 mg total) by mouth every 6 (six) hours as needed for headaches, Starting Tue 1/16/2024, Normal      naproxen (EC NAPROSYN) 500 MG EC tablet Take 1 tablet (500 mg total) by mouth 2 (two) times a day with meals, Starting Sat 10/26/2024,  Until Sun 10/26/2025, Normal      norgestimate-ethinyl estradiol (Sprintec 28) 0.25-35 MG-MCG per tablet Take 1 tablet by mouth daily, Starting Tue 2/4/2025, Normal      SUMAtriptan (IMITREX) 100 mg tablet Take 1 tablet (100 mg total) by mouth once as needed for migraine for up to 2 doses may repeat in 2 hours if necessary. Max dose 200mg in 24 hour period., Starting Tue 1/16/2024, Normal           No discharge procedures on file.  ED SEPSIS DOCUMENTATION   Time reflects when diagnosis was documented in both MDM as applicable and the Disposition within this note       Time User Action Codes Description Comment    2/23/2025  9:42 AM Aiden Navarro [W57.XXXA] Bug bite, initial encounter     2/23/2025  9:42 AM Aiden Navarro [R21] Rash                  Aiden Navarro MD  02/23/25 6734

## 2025-05-29 ENCOUNTER — TELEPHONE (OUTPATIENT)
Dept: OBGYN CLINIC | Facility: CLINIC | Age: 25
End: 2025-05-29

## 2025-05-29 NOTE — TELEPHONE ENCOUNTER
Voicemail:Lauren Henriquez, [email or web address] Lanza. Estoy llchondo para reprogramar mi jessica. Por favor me puede llamar al [phone]. Huseyin.    Pt has been scheduled for 6/2725

## 2025-06-27 ENCOUNTER — ANNUAL EXAM (OUTPATIENT)
Dept: OBGYN CLINIC | Facility: CLINIC | Age: 25
End: 2025-06-27

## 2025-06-27 VITALS
DIASTOLIC BLOOD PRESSURE: 60 MMHG | SYSTOLIC BLOOD PRESSURE: 112 MMHG | BODY MASS INDEX: 27.04 KG/M2 | WEIGHT: 152.6 LBS | HEIGHT: 63 IN

## 2025-06-27 DIAGNOSIS — Z30.09 UNWANTED FERTILITY: ICD-10-CM

## 2025-06-27 DIAGNOSIS — Z12.39 ENCOUNTER FOR BREAST CANCER SCREENING USING NON-MAMMOGRAM MODALITY: ICD-10-CM

## 2025-06-27 DIAGNOSIS — Z12.4 SCREENING FOR CERVICAL CANCER: ICD-10-CM

## 2025-06-27 DIAGNOSIS — Z11.3 SCREEN FOR STD (SEXUALLY TRANSMITTED DISEASE): ICD-10-CM

## 2025-06-27 DIAGNOSIS — Z01.419 ENCOUNTER FOR GYNECOLOGICAL EXAMINATION WITHOUT ABNORMAL FINDING: Primary | ICD-10-CM

## 2025-06-27 PROBLEM — M25.561 ACUTE PAIN OF BOTH KNEES: Status: RESOLVED | Noted: 2024-09-05 | Resolved: 2025-06-27

## 2025-06-27 PROBLEM — M25.562 ACUTE PAIN OF BOTH KNEES: Status: RESOLVED | Noted: 2024-09-05 | Resolved: 2025-06-27

## 2025-06-27 PROCEDURE — 87563 M. GENITALIUM AMP PROBE: CPT | Performed by: NURSE PRACTITIONER

## 2025-06-27 PROCEDURE — 87661 TRICHOMONAS VAGINALIS AMPLIF: CPT | Performed by: NURSE PRACTITIONER

## 2025-06-27 PROCEDURE — 99395 PREV VISIT EST AGE 18-39: CPT | Performed by: NURSE PRACTITIONER

## 2025-06-27 PROCEDURE — 87491 CHLMYD TRACH DNA AMP PROBE: CPT | Performed by: NURSE PRACTITIONER

## 2025-06-27 PROCEDURE — 87591 N.GONORRHOEAE DNA AMP PROB: CPT | Performed by: NURSE PRACTITIONER

## 2025-06-27 RX ORDER — NORGESTIMATE AND ETHINYL ESTRADIOL 0.25-0.035
1 KIT ORAL DAILY
Qty: 28 TABLET | Refills: 12 | Status: SHIPPED | OUTPATIENT
Start: 2025-06-27

## 2025-06-27 NOTE — PROGRESS NOTES
ANNUAL GYNECOLOGICAL EXAMINATION    Donna Farley is a 24 y.o. female who presents today for annual GYN exam.  Her last pap smear was performed 5/3/2023 and result was NILM.  She reports no history of abnormal pap smears in her past.  She had HIV screening performed 2019 and it was negative.  She reports menses as regular.  Patient's last menstrual period was 2025 (exact date).  Her general medical history has been reviewed and she reports it as follows:    Past Medical History:   Diagnosis Date   • Migraines      Past Surgical History:   Procedure Laterality Date   • NO PAST SURGERIES       OB History          1    Para   1    Term   1       0    AB   0    Living   1         SAB   0    IAB   0    Ectopic   0    Multiple   0    Live Births   1               Social History     Tobacco Use   • Smoking status: Never   • Smokeless tobacco: Never   Vaping Use   • Vaping status: Never Used   Substance Use Topics   • Alcohol use: Never   • Drug use: Never     Social History     Substance and Sexual Activity   Sexual Activity Not Currently   • Partners: Male   • Birth control/protection: OCP     Cancer-related family history is negative for Cancer, Breast cancer, Colon cancer, and Ovarian cancer.    Current Outpatient Medications   Medication Instructions   • acetaminophen (TYLENOL) 500 mg, Oral, Every 6 hours PRN   • diphenhydrAMINE (BENADRYL) 25 mg, Oral, Every 6 hours PRN   • norgestimate-ethinyl estradiol (Sprintec 28) 0.25-35 MG-MCG per tablet 1 tablet, Oral, Daily       Review of Systems:  Review of Systems   Constitutional: Negative.    Gastrointestinal: Negative.    Genitourinary:  Negative for difficulty urinating, menstrual problem, pelvic pain and vaginal discharge.   Skin: Negative.        Physical Exam:  /60 (BP Location: Left arm, Patient Position: Sitting, Cuff Size: Adult)   Wt 69.2 kg (152 lb 9.6 oz)   BMI 27.03 kg/m²   Physical Exam  Constitutional:       General:  She is not in acute distress.     Appearance: She is well-developed.   Genitourinary:      Vulva normal.      No lesions in the vagina.        Right Adnexa: not tender and no mass present.     Left Adnexa: not tender and no mass present.     No cervical motion tenderness or lesion.      Uterus is not tender.   Breasts:     Right: No mass, nipple discharge, skin change or tenderness.      Left: No mass, nipple discharge, skin change or tenderness.   Neck:      Thyroid: No thyromegaly.     Cardiovascular:      Rate and Rhythm: Normal rate and regular rhythm.   Pulmonary:      Effort: Pulmonary effort is normal.   Abdominal:      Palpations: Abdomen is soft.      Tenderness: There is no abdominal tenderness.     Musculoskeletal:      Cervical back: Neck supple.     Neurological:      Mental Status: She is alert and oriented to person, place, and time.     Skin:     General: Skin is warm and dry.   Vitals reviewed.     Assessment/Plan:   1. Normal well-woman GYN exam.  2. Cervical cancer screening:  Normal cervical exam.  Pap smear not indicated at this time.  Has received full HPV vaccine series in the past.   3. STD screening:  Orders placed for vaginal GC/CT, trichomonas/mycoplasma genitalium cultures.  Orders placed for serum anti-HIV, anti-HCV, HbsAg, syphilis panel.   4. Breast cancer screening:  Normal breast exam.  Reviewed breast self-awareness.   5. Depression Screening: Patient's depression screening was assessed with a PHQ-2 score of 0. Clinically patient does not have depression. No treatment is required.   6. BMI Counseling: Body mass index is 27.03 kg/m².  No intervention indicated.   7. Contraception:  OCP's.  Given Rx refills for another year.   8. Return to office in 1 year for annual GYN exam.    Reviewed with patient that [facility] results [facility], but that they will not necessarily be reviewed by me immediately.  Explained that I will review results at my earliest opportunity  and contact patient appropriately.

## 2025-06-28 ENCOUNTER — APPOINTMENT (OUTPATIENT)
Dept: LAB | Facility: HOSPITAL | Age: 25
End: 2025-06-28

## 2025-06-28 DIAGNOSIS — Z11.3 SCREEN FOR STD (SEXUALLY TRANSMITTED DISEASE): ICD-10-CM

## 2025-06-28 PROCEDURE — 86803 HEPATITIS C AB TEST: CPT

## 2025-06-28 PROCEDURE — 87389 HIV-1 AG W/HIV-1&-2 AB AG IA: CPT

## 2025-06-28 PROCEDURE — 36415 COLL VENOUS BLD VENIPUNCTURE: CPT

## 2025-06-28 PROCEDURE — 86780 TREPONEMA PALLIDUM: CPT

## 2025-06-28 PROCEDURE — 87340 HEPATITIS B SURFACE AG IA: CPT

## 2025-06-29 LAB
HBV SURFACE AG SER QL: NORMAL
HCV AB SER QL: NORMAL
HIV 1+2 AB+HIV1 P24 AG SERPL QL IA: NORMAL
TREPONEMA PALLIDUM IGG+IGM AB [PRESENCE] IN SERUM OR PLASMA BY IMMUNOASSAY: NORMAL

## 2025-06-30 LAB
C TRACH DNA SPEC QL NAA+PROBE: NEGATIVE
M GENITALIUM DNA SPEC QL NAA+PROBE: NEGATIVE
N GONORRHOEA DNA SPEC QL NAA+PROBE: NEGATIVE
T VAGINALIS DNA SPEC QL NAA+PROBE: NEGATIVE